# Patient Record
Sex: FEMALE | Race: WHITE | NOT HISPANIC OR LATINO
[De-identification: names, ages, dates, MRNs, and addresses within clinical notes are randomized per-mention and may not be internally consistent; named-entity substitution may affect disease eponyms.]

---

## 2019-10-14 ENCOUNTER — TRANSCRIPTION ENCOUNTER (OUTPATIENT)
Age: 59
End: 2019-10-14

## 2020-04-26 ENCOUNTER — MESSAGE (OUTPATIENT)
Age: 60
End: 2020-04-26

## 2020-05-07 ENCOUNTER — APPOINTMENT (OUTPATIENT)
Dept: DISASTER EMERGENCY | Facility: CLINIC | Age: 60
End: 2020-05-07

## 2020-05-07 LAB
SARS-COV-2 IGG SERPL IA-ACNC: <0.1 INDEX
SARS-COV-2 IGG SERPL QL IA: NEGATIVE

## 2021-06-27 ENCOUNTER — TRANSCRIPTION ENCOUNTER (OUTPATIENT)
Age: 61
End: 2021-06-27

## 2022-06-23 ENCOUNTER — NON-APPOINTMENT (OUTPATIENT)
Age: 62
End: 2022-06-23

## 2022-06-28 ENCOUNTER — INPATIENT (INPATIENT)
Facility: HOSPITAL | Age: 62
LOS: 2 days | Discharge: HOME | DRG: 178 | End: 2022-07-01
Attending: STUDENT IN AN ORGANIZED HEALTH CARE EDUCATION/TRAINING PROGRAM | Admitting: STUDENT IN AN ORGANIZED HEALTH CARE EDUCATION/TRAINING PROGRAM
Payer: COMMERCIAL

## 2022-06-28 VITALS
OXYGEN SATURATION: 92 % | HEART RATE: 96 BPM | RESPIRATION RATE: 18 BRPM | SYSTOLIC BLOOD PRESSURE: 100 MMHG | TEMPERATURE: 99 F | DIASTOLIC BLOOD PRESSURE: 83 MMHG

## 2022-06-28 DIAGNOSIS — J18.9 PNEUMONIA, UNSPECIFIED ORGANISM: ICD-10-CM

## 2022-06-28 LAB
ALBUMIN SERPL ELPH-MCNC: 2.5 G/DL — LOW (ref 3.3–5)
ALP SERPL-CCNC: 196 U/L — HIGH (ref 40–120)
ALT FLD-CCNC: 89 U/L — HIGH (ref 12–78)
ANION GAP SERPL CALC-SCNC: 7 MMOL/L — SIGNIFICANT CHANGE UP (ref 5–17)
APPEARANCE UR: ABNORMAL
AST SERPL-CCNC: 192 U/L — HIGH (ref 15–37)
B BURGDOR C6 AB SER-ACNC: NEGATIVE — SIGNIFICANT CHANGE UP
B BURGDOR IGG+IGM SER-ACNC: 0.05 INDEX — SIGNIFICANT CHANGE UP (ref 0.01–0.89)
BASOPHILS # BLD AUTO: 0 K/UL — SIGNIFICANT CHANGE UP (ref 0–0.2)
BASOPHILS NFR BLD AUTO: 0 % — SIGNIFICANT CHANGE UP (ref 0–2)
BILIRUB SERPL-MCNC: 1.6 MG/DL — HIGH (ref 0.2–1.2)
BILIRUB UR-MCNC: ABNORMAL
BUN SERPL-MCNC: 24 MG/DL — HIGH (ref 7–23)
CALCIUM SERPL-MCNC: 9 MG/DL — SIGNIFICANT CHANGE UP (ref 8.5–10.1)
CHLORIDE SERPL-SCNC: 93 MMOL/L — LOW (ref 96–108)
CO2 SERPL-SCNC: 32 MMOL/L — HIGH (ref 22–31)
COLOR SPEC: YELLOW — SIGNIFICANT CHANGE UP
CREAT SERPL-MCNC: 1.2 MG/DL — SIGNIFICANT CHANGE UP (ref 0.5–1.3)
DIFF PNL FLD: ABNORMAL
EGFR: 52 ML/MIN/1.73M2 — LOW
EOSINOPHIL # BLD AUTO: 0 K/UL — SIGNIFICANT CHANGE UP (ref 0–0.5)
EOSINOPHIL NFR BLD AUTO: 0 % — SIGNIFICANT CHANGE UP (ref 0–6)
FLUAV AG NPH QL: SIGNIFICANT CHANGE UP
FLUBV AG NPH QL: SIGNIFICANT CHANGE UP
GLUCOSE SERPL-MCNC: 115 MG/DL — HIGH (ref 70–99)
GLUCOSE UR QL: NEGATIVE — SIGNIFICANT CHANGE UP
HCT VFR BLD CALC: 41.7 % — SIGNIFICANT CHANGE UP (ref 34.5–45)
HGB BLD-MCNC: 14.4 G/DL — SIGNIFICANT CHANGE UP (ref 11.5–15.5)
KETONES UR-MCNC: NEGATIVE — SIGNIFICANT CHANGE UP
LEUKOCYTE ESTERASE UR-ACNC: ABNORMAL
LIDOCAIN IGE QN: 1072 U/L — HIGH (ref 73–393)
LYMPHOCYTES # BLD AUTO: 0.72 K/UL — LOW (ref 1–3.3)
LYMPHOCYTES # BLD AUTO: 8.5 % — LOW (ref 13–44)
MCHC RBC-ENTMCNC: 31.8 PG — SIGNIFICANT CHANGE UP (ref 27–34)
MCHC RBC-ENTMCNC: 34.5 GM/DL — SIGNIFICANT CHANGE UP (ref 32–36)
MCV RBC AUTO: 92.1 FL — SIGNIFICANT CHANGE UP (ref 80–100)
MONOCYTES # BLD AUTO: 0.68 K/UL — SIGNIFICANT CHANGE UP (ref 0–0.9)
MONOCYTES NFR BLD AUTO: 8 % — SIGNIFICANT CHANGE UP (ref 2–14)
NEUTROPHILS # BLD AUTO: 6.76 K/UL — SIGNIFICANT CHANGE UP (ref 1.8–7.4)
NEUTROPHILS NFR BLD AUTO: 75 % — SIGNIFICANT CHANGE UP (ref 43–77)
NITRITE UR-MCNC: NEGATIVE — SIGNIFICANT CHANGE UP
NRBC # BLD: SIGNIFICANT CHANGE UP /100 WBCS (ref 0–0)
PH UR: 6 — SIGNIFICANT CHANGE UP (ref 5–8)
PLATELET # BLD AUTO: 234 K/UL — SIGNIFICANT CHANGE UP (ref 150–400)
POTASSIUM SERPL-MCNC: 3.5 MMOL/L — SIGNIFICANT CHANGE UP (ref 3.5–5.3)
POTASSIUM SERPL-SCNC: 3.5 MMOL/L — SIGNIFICANT CHANGE UP (ref 3.5–5.3)
PROT SERPL-MCNC: 7.5 GM/DL — SIGNIFICANT CHANGE UP (ref 6–8.3)
PROT UR-MCNC: 30 MG/DL
RBC # BLD: 4.53 M/UL — SIGNIFICANT CHANGE UP (ref 3.8–5.2)
RBC # FLD: 13.8 % — SIGNIFICANT CHANGE UP (ref 10.3–14.5)
RSV RNA NPH QL NAA+NON-PROBE: SIGNIFICANT CHANGE UP
SARS-COV-2 RNA SPEC QL NAA+PROBE: SIGNIFICANT CHANGE UP
SODIUM SERPL-SCNC: 132 MMOL/L — LOW (ref 135–145)
SP GR SPEC: 1.01 — SIGNIFICANT CHANGE UP (ref 1.01–1.02)
TROPONIN I, HIGH SENSITIVITY RESULT: 23.91 NG/L — SIGNIFICANT CHANGE UP
UROBILINOGEN FLD QL: 8
WBC # BLD: 8.5 K/UL — SIGNIFICANT CHANGE UP (ref 3.8–10.5)
WBC # FLD AUTO: 8.5 K/UL — SIGNIFICANT CHANGE UP (ref 3.8–10.5)

## 2022-06-28 PROCEDURE — 74177 CT ABD & PELVIS W/CONTRAST: CPT | Mod: 26,MA

## 2022-06-28 PROCEDURE — 99223 1ST HOSP IP/OBS HIGH 75: CPT

## 2022-06-28 PROCEDURE — 86803 HEPATITIS C AB TEST: CPT

## 2022-06-28 PROCEDURE — 71045 X-RAY EXAM CHEST 1 VIEW: CPT | Mod: 26

## 2022-06-28 PROCEDURE — 99285 EMERGENCY DEPT VISIT HI MDM: CPT

## 2022-06-28 PROCEDURE — 71250 CT THORAX DX C-: CPT | Mod: 26,MA

## 2022-06-28 PROCEDURE — 94760 N-INVAS EAR/PLS OXIMETRY 1: CPT

## 2022-06-28 PROCEDURE — 83735 ASSAY OF MAGNESIUM: CPT

## 2022-06-28 PROCEDURE — 93010 ELECTROCARDIOGRAM REPORT: CPT

## 2022-06-28 PROCEDURE — 85027 COMPLETE CBC AUTOMATED: CPT

## 2022-06-28 PROCEDURE — 83690 ASSAY OF LIPASE: CPT

## 2022-06-28 PROCEDURE — 87449 NOS EACH ORGANISM AG IA: CPT

## 2022-06-28 PROCEDURE — 76705 ECHO EXAM OF ABDOMEN: CPT | Mod: 26

## 2022-06-28 PROCEDURE — 80053 COMPREHEN METABOLIC PANEL: CPT

## 2022-06-28 PROCEDURE — 36415 COLL VENOUS BLD VENIPUNCTURE: CPT

## 2022-06-28 RX ORDER — SODIUM CHLORIDE 9 MG/ML
1000 INJECTION INTRAMUSCULAR; INTRAVENOUS; SUBCUTANEOUS ONCE
Refills: 0 | Status: COMPLETED | OUTPATIENT
Start: 2022-06-28 | End: 2022-06-28

## 2022-06-28 RX ORDER — ACETAMINOPHEN 500 MG
650 TABLET ORAL EVERY 6 HOURS
Refills: 0 | Status: DISCONTINUED | OUTPATIENT
Start: 2022-06-28 | End: 2022-07-01

## 2022-06-28 RX ORDER — AZITHROMYCIN 500 MG/1
500 TABLET, FILM COATED ORAL ONCE
Refills: 0 | Status: COMPLETED | OUTPATIENT
Start: 2022-06-28 | End: 2022-06-28

## 2022-06-28 RX ORDER — LOSARTAN/HYDROCHLOROTHIAZIDE 100MG-25MG
1 TABLET ORAL
Qty: 0 | Refills: 0 | DISCHARGE

## 2022-06-28 RX ORDER — LANOLIN ALCOHOL/MO/W.PET/CERES
3 CREAM (GRAM) TOPICAL AT BEDTIME
Refills: 0 | Status: DISCONTINUED | OUTPATIENT
Start: 2022-06-28 | End: 2022-07-01

## 2022-06-28 RX ORDER — SODIUM CHLORIDE 9 MG/ML
1000 INJECTION INTRAMUSCULAR; INTRAVENOUS; SUBCUTANEOUS
Refills: 0 | Status: DISCONTINUED | OUTPATIENT
Start: 2022-06-28 | End: 2022-06-30

## 2022-06-28 RX ORDER — ONDANSETRON 8 MG/1
4 TABLET, FILM COATED ORAL EVERY 8 HOURS
Refills: 0 | Status: DISCONTINUED | OUTPATIENT
Start: 2022-06-28 | End: 2022-07-01

## 2022-06-28 RX ORDER — LABETALOL HCL 100 MG
300 TABLET ORAL EVERY 12 HOURS
Refills: 0 | Status: DISCONTINUED | OUTPATIENT
Start: 2022-06-28 | End: 2022-07-01

## 2022-06-28 RX ORDER — ENOXAPARIN SODIUM 100 MG/ML
40 INJECTION SUBCUTANEOUS EVERY 24 HOURS
Refills: 0 | Status: DISCONTINUED | OUTPATIENT
Start: 2022-06-28 | End: 2022-07-01

## 2022-06-28 RX ORDER — CEFTRIAXONE 500 MG/1
1000 INJECTION, POWDER, FOR SOLUTION INTRAMUSCULAR; INTRAVENOUS ONCE
Refills: 0 | Status: COMPLETED | OUTPATIENT
Start: 2022-06-28 | End: 2022-06-28

## 2022-06-28 RX ORDER — ATORVASTATIN CALCIUM 80 MG/1
40 TABLET, FILM COATED ORAL DAILY
Refills: 0 | Status: DISCONTINUED | OUTPATIENT
Start: 2022-06-28 | End: 2022-07-01

## 2022-06-28 RX ORDER — AZITHROMYCIN 500 MG/1
500 TABLET, FILM COATED ORAL EVERY 24 HOURS
Refills: 0 | Status: DISCONTINUED | OUTPATIENT
Start: 2022-06-28 | End: 2022-07-01

## 2022-06-28 RX ORDER — LABETALOL HCL 100 MG
600 TABLET ORAL
Refills: 0 | Status: DISCONTINUED | OUTPATIENT
Start: 2022-06-28 | End: 2022-06-28

## 2022-06-28 RX ORDER — ATORVASTATIN CALCIUM 80 MG/1
1 TABLET, FILM COATED ORAL
Qty: 0 | Refills: 0 | DISCHARGE

## 2022-06-28 RX ORDER — LABETALOL HCL 100 MG
2 TABLET ORAL
Qty: 0 | Refills: 0 | DISCHARGE

## 2022-06-28 RX ORDER — OXYMETAZOLINE HYDROCHLORIDE 0.5 MG/ML
1 SPRAY NASAL ONCE
Refills: 0 | Status: COMPLETED | OUTPATIENT
Start: 2022-06-28 | End: 2022-06-28

## 2022-06-28 RX ORDER — ASPIRIN/CALCIUM CARB/MAGNESIUM 324 MG
81 TABLET ORAL DAILY
Refills: 0 | Status: DISCONTINUED | OUTPATIENT
Start: 2022-06-28 | End: 2022-07-01

## 2022-06-28 RX ORDER — ASPIRIN/CALCIUM CARB/MAGNESIUM 324 MG
1 TABLET ORAL
Qty: 0 | Refills: 0 | DISCHARGE

## 2022-06-28 RX ORDER — CEFTRIAXONE 500 MG/1
2000 INJECTION, POWDER, FOR SOLUTION INTRAMUSCULAR; INTRAVENOUS EVERY 24 HOURS
Refills: 0 | Status: DISCONTINUED | OUTPATIENT
Start: 2022-06-28 | End: 2022-06-29

## 2022-06-28 RX ADMIN — Medication 300 MILLIGRAM(S): at 21:16

## 2022-06-28 RX ADMIN — OXYMETAZOLINE HYDROCHLORIDE 1 SPRAY(S): 0.5 SPRAY NASAL at 12:20

## 2022-06-28 RX ADMIN — AZITHROMYCIN 500 MILLIGRAM(S): 500 TABLET, FILM COATED ORAL at 12:50

## 2022-06-28 RX ADMIN — CEFTRIAXONE 100 MILLIGRAM(S): 500 INJECTION, POWDER, FOR SOLUTION INTRAMUSCULAR; INTRAVENOUS at 12:50

## 2022-06-28 RX ADMIN — SODIUM CHLORIDE 83 MILLILITER(S): 9 INJECTION INTRAMUSCULAR; INTRAVENOUS; SUBCUTANEOUS at 17:12

## 2022-06-28 RX ADMIN — ATORVASTATIN CALCIUM 40 MILLIGRAM(S): 80 TABLET, FILM COATED ORAL at 21:16

## 2022-06-28 RX ADMIN — SODIUM CHLORIDE 1000 MILLILITER(S): 9 INJECTION INTRAMUSCULAR; INTRAVENOUS; SUBCUTANEOUS at 11:18

## 2022-06-28 NOTE — PATIENT PROFILE ADULT - INFLUENZA IMMUNIZATION DATE (APPROXIMATE)
----- Message from Manisha Perez MD sent at 3/14/2020  6:24 PM CDT -----  Test results are normal.   01-Oct-2021

## 2022-06-28 NOTE — PHARMACOTHERAPY INTERVENTION NOTE - COMMENTS
Medication history complete, reviewed medication with patient and [facility]

## 2022-06-28 NOTE — ED STATDOCS - PROGRESS NOTE DETAILS
63 yo female with a PMH of htn, hld, presents with weakness since last monday. Pt noticed that it started monday and worsened tuesday into wednesday. Thursday she mentioned that couldn't get out of bed and had a fever of 103F 2 days ago. Pt was seen at urgent care and told her its prob a reaction to her pfizer vaccines that she received a long time ago and came back negative for covid. +nausea. Denies rashes, hiking, abd pain, cp, sob. Will obtain labs, ivf, cxr, and reeval. -Job Rodriguez PA-C Discussed results and plan with pt for admission. Because of elevated LFTs, bili and CT finding of the gallbladder discussed case with surgery resident who will see pt but doesn't think it is related to the gallbladder. Pt to be admitted to medicine. -Job Rodriguez PA-C

## 2022-06-28 NOTE — CONSULT NOTE ADULT - ASSESSMENT
62F admitted for weakness and fever presents with findings of cholelithiasis and elevated LFTs. Likely underlying gallstone pancreatitis and possible recent hx of choledocholithiasis    recommendations:  CLD  GI evaluation  medical management of pneumonia  trend LFTs and lipase  serial abdominal exams  possible laparoscopic cholecystectomy during this admission    Plan discussed with Dr. Sr 62F admitted for weakness and fever presents with findings of cholelithiasis and elevated LFTs. Likely underlying gallstone pancreatitis and possible recent hx of choledocholithiasis    recommendations:  CLD  GI evaluation  IV abx  medical management of pneumonia  trend LFTs and lipase  serial abdominal exams  possible laparoscopic cholecystectomy during this admission    Plan discussed with Dr. Sr

## 2022-06-28 NOTE — ED STATDOCS - OBJECTIVE STATEMENT
62 y//o female with a PMHx of HTN and HLD presents to the ED with cough, weakness and decreased po intake x8 days Pt with Fever x2 days ago Tmax 103. Pt went to Urgent Care and tested negative for COVID at that time. No abdominal pain, no chest pain, no rashes. No  symptoms. PMD: Dr. Denys Mooney. 63 y/o female with a PMHx of HTN and HLD presents to the ED with cough, weakness and decreased po intake x8 days Pt with Fever x2 days ago Tmax 103. Pt went to Urgent Care and tested negative for COVID at that time. No abdominal pain, no chest pain, no rashes. No  symptoms. PMD: Dr. Denys Mooney.

## 2022-06-28 NOTE — PATIENT PROFILE ADULT - FALL HARM RISK - UNIVERSAL INTERVENTIONS
Bed in lowest position, wheels locked, appropriate side rails in place/Call bell, personal items and telephone in reach/Instruct patient to call for assistance before getting out of bed or chair/Non-slip footwear when patient is out of bed/[place] to call system/Physically safe environment - no spills, clutter or unnecessary equipment/Purposeful Proactive Rounding/Room/bathroom lighting operational, light cord in reach

## 2022-06-28 NOTE — H&P ADULT - HISTORY OF PRESENT ILLNESS
63 y/o female with a PMHx of HTN and HLD presents to the ED with cough, weakness and decreased po intake x8 days Pt with Fever x2 days ago Tmax 103. Pt went to Urgent Care and tested negative for COVID at that time. No abdominal pain, no chest pain, no rashes. No  symptoms. PMD: Dr. Denys Mooney.  She states that she mostly had symptoms of exhaustion , chills and intermittent fevers for the past week.   She has pets at home: a Guinea pig    PAST MEDICAL HISTORY:  HLD (hyperlipidemia)   HTN (hypertension).    Surgical History:  C section  bilateral knee meniscal repair    Social History:  no smoking, no Etoh    Family History:   father had massive MI age 63        REVIEW OF SYSTEMS:    CONSTITUTIONAL: No weakness, +fevers, +chills  ENT: No ear ache, No sorethroat  NECK: No pain, No stiffness  RESPIRATORY: No cough, No wheezing, No hemoptysis; No dyspnea  CARDIOVASCULAR: No chest pain, No palpitations  GASTROINTESTINAL: No abd pain, No nausea, No vomiting, No hematemesis, No diarrhea or constipation. No melena, No hematochezia.  GENITOURINARY: No dysuria, No  hematuria  NEUROLOGICAL: No diplopia, No paresthesia, No motor dysfunction  MUSCULOSKELETAL: No arthralgia, No myalgia  SKIN: No rashes, or lesions   PSYCH: no anxiety, no suicidal ideation    All other review of systems is negative unless indicated above    Vital Signs Last 24 Hrs  T(C): 37.3 (28 Jun 2022 10:04), Max: 37.3 (28 Jun 2022 10:04)  T(F): 99.1 (28 Jun 2022 10:04), Max: 99.1 (28 Jun 2022 10:04)  HR: 96 (28 Jun 2022 10:04) (96 - 96)  BP: 100/83 (28 Jun 2022 10:04) (100/83 - 100/83)  BP(mean): 89 (28 Jun 2022 10:04) (89 - 89)  RR: 18 (28 Jun 2022 10:04) (18 - 18)  SpO2: 92% (28 Jun 2022 10:04) (92% - 92%)    PHYSICAL EXAM:    GENERAL: NAD  HEENT:  NC/AT, EOMI, PERRLA, No scleral icterus, Moist mucous membranes  NECK: Supple, No JVD  CNS:  Alert & Oriented X3, Motor Strength 5/5 B/L upper and lower extremities; DTRs 2+ intact   LUNG: Normal Breath sounds, Clear to auscultation bilaterally, No rales, No rhonchi, No wheezing  HEART: RRR; No murmurs, No rubs  ABDOMEN: +BS, ST/ND/NT  GENITOURINARY: Voiding, Bladder not distended  EXTREMITIES:  2+ Peripheral Pulses, No clubbing, No cyanosis, No tibial edema  MUSCULOSKELTAL: Joints normal ROM, No TTP, No effusion  SKIN: no rashes  RECTAL: deferred, not indicated  BREAST: deferred                          14.4   8.50  )-----------( 234      ( 28 Jun 2022 10:43 )             41.7     06-28    132<L>  |  93<L>  |  24<H>  ----------------------------<  115<H>  3.5   |  32<H>  |  1.20    Ca    9.0      28 Jun 2022 10:43    TPro  7.5  /  Alb  2.5<L>  /  TBili  1.6<H>  /  DBili  x   /  AST  192<H>  /  ALT  89<H>  /  AlkPhos  196<H>  06-28      a/p:    1. Bilateral Pna:  In view of abnormal LFTs will consider Legionella Pna in the differential   Ceftriaxone / Zithromax IV  f/u Blood cx  Legionella urine Ag    2. Transaminitis:  unlikely due to GB pathology; surgical eval noted  +hepatic steatosis on CT and US  will check hepatitis panel  will consider transaminitis due to sepsis (possible Legionella Pna)    3. Hyponatremia due to Pna and dehydration:  NS at 75cc/hr x 24hrs        61 y/o female with a PMHx of HTN and HLD presents to the ED with cough, weakness and decreased po intake x8 days Pt with Fever x2 days ago Tmax 103. Pt went to Urgent Care and tested negative for COVID at that time. No abdominal pain, no chest pain, no rashes. No  symptoms. PMD: Dr. Denys Mooney.  She states that she mostly had symptoms of exhaustion , chills and intermittent fevers for the past week.   She has pets at home: a Guinea pig    PAST MEDICAL HISTORY:  HLD (hyperlipidemia)   HTN (hypertension).    Surgical History:  C section  bilateral knee meniscal repair    Social History:  no smoking, no Etoh    Family History:   father had massive MI age 63        REVIEW OF SYSTEMS:    CONSTITUTIONAL: No weakness, +fevers, +chills  ENT: No ear ache, No sorethroat  NECK: No pain, No stiffness  RESPIRATORY: No cough, No wheezing, No hemoptysis; No dyspnea  CARDIOVASCULAR: No chest pain, No palpitations  GASTROINTESTINAL: No abd pain, No nausea, No vomiting, No hematemesis, No diarrhea or constipation. No melena, No hematochezia.  GENITOURINARY: No dysuria, No  hematuria  NEUROLOGICAL: No diplopia, No paresthesia, No motor dysfunction  MUSCULOSKELETAL: No arthralgia, No myalgia  SKIN: No rashes, or lesions   PSYCH: no anxiety, no suicidal ideation    All other review of systems is negative unless indicated above    Vital Signs Last 24 Hrs  T(C): 37.3 (28 Jun 2022 10:04), Max: 37.3 (28 Jun 2022 10:04)  T(F): 99.1 (28 Jun 2022 10:04), Max: 99.1 (28 Jun 2022 10:04)  HR: 96 (28 Jun 2022 10:04) (96 - 96)  BP: 100/83 (28 Jun 2022 10:04) (100/83 - 100/83)  BP(mean): 89 (28 Jun 2022 10:04) (89 - 89)  RR: 18 (28 Jun 2022 10:04) (18 - 18)  SpO2: 92% (28 Jun 2022 10:04) (92% - 92%)    PHYSICAL EXAM:    GENERAL: NAD  HEENT:  NC/AT, EOMI, PERRLA, No scleral icterus, Moist mucous membranes  NECK: Supple, No JVD  CNS:  Alert & Oriented X3, Motor Strength 5/5 B/L upper and lower extremities; DTRs 2+ intact   LUNG: Normal Breath sounds, +bilateral  rales, No rhonchi, No wheezing  HEART: RRR; No murmurs, No rubs  ABDOMEN: +BS, ST/ND/NT  GENITOURINARY: Voiding, Bladder not distended  EXTREMITIES:  2+ Peripheral Pulses, No clubbing, No cyanosis, No tibial edema  MUSCULOSKELTAL: Joints normal ROM, No TTP, No effusion  SKIN: no rashes  RECTAL: deferred, not indicated  BREAST: deferred                          14.4   8.50  )-----------( 234      ( 28 Jun 2022 10:43 )             41.7     06-28    132<L>  |  93<L>  |  24<H>  ----------------------------<  115<H>  3.5   |  32<H>  |  1.20    Ca    9.0      28 Jun 2022 10:43    TPro  7.5  /  Alb  2.5<L>  /  TBili  1.6<H>  /  DBili  x   /  AST  192<H>  /  ALT  89<H>  /  AlkPhos  196<H>  06-28      a/p:    1. Bilateral Pna:  In view of abnormal LFTs will consider Legionella Pna in the differential   Ceftriaxone / Zithromax IV  f/u Blood cx  Legionella urine Ag    2. Transaminitis:  unlikely due to GB pathology; surgical eval noted  +hepatic steatosis on CT and US  will check hepatitis panel  will consider transaminitis due to sepsis (possible Legionella Pna)    3. Hyponatremia due to Pna and dehydration:  NS at 75cc/hr x 24hrs        61 y/o female with a PMHx of HTN and HLD presents to the ED with cough, weakness and decreased po intake x8 days Pt with Fever x2 days ago Tmax 103. Pt went to Urgent Care and tested negative for COVID at that time. No abdominal pain, no chest pain, no rashes. No  symptoms. PMD: Dr. Denys Mooney.  She states that she mostly had symptoms of exhaustion , chills and intermittent fevers for the past week.   She has pets at home: a Guinea pig    PAST MEDICAL HISTORY:  HLD (hyperlipidemia)   HTN (hypertension).    Surgical History:  C section  bilateral knee meniscal repair    Social History:  no smoking, no Etoh    Family History:   father had massive MI age 63        REVIEW OF SYSTEMS:    CONSTITUTIONAL: No weakness, +fevers, +chills  ENT: No ear ache, No sorethroat  NECK: No pain, No stiffness  RESPIRATORY: No cough, No wheezing, No hemoptysis; No dyspnea  CARDIOVASCULAR: No chest pain, No palpitations  GASTROINTESTINAL: No abd pain, No nausea, No vomiting, No hematemesis, No diarrhea or constipation. No melena, No hematochezia.  GENITOURINARY: No dysuria, No  hematuria  NEUROLOGICAL: No diplopia, No paresthesia, No motor dysfunction  MUSCULOSKELETAL: No arthralgia, No myalgia  SKIN: No rashes, or lesions   PSYCH: no anxiety, no suicidal ideation    All other review of systems is negative unless indicated above    Vital Signs Last 24 Hrs  T(C): 37.3 (28 Jun 2022 10:04), Max: 37.3 (28 Jun 2022 10:04)  T(F): 99.1 (28 Jun 2022 10:04), Max: 99.1 (28 Jun 2022 10:04)  HR: 96 (28 Jun 2022 10:04) (96 - 96)  BP: 100/83 (28 Jun 2022 10:04) (100/83 - 100/83)  BP(mean): 89 (28 Jun 2022 10:04) (89 - 89)  RR: 18 (28 Jun 2022 10:04) (18 - 18)  SpO2: 92% (28 Jun 2022 10:04) (92% - 92%)    PHYSICAL EXAM:    GENERAL: NAD  HEENT:  NC/AT, EOMI, PERRLA, No scleral icterus, Moist mucous membranes  NECK: Supple, No JVD  CNS:  Alert & Oriented X3, Motor Strength 5/5 B/L upper and lower extremities; DTRs 2+ intact   LUNG: Normal Breath sounds, +bilateral  rales, No rhonchi, No wheezing  HEART: RRR; No murmurs, No rubs  ABDOMEN: +BS, ST/ND/NT  GENITOURINARY: Voiding, Bladder not distended  EXTREMITIES:  2+ Peripheral Pulses, No clubbing, No cyanosis, No tibial edema  MUSCULOSKELTAL: Joints normal ROM, No TTP, No effusion  SKIN: no rashes  RECTAL: deferred, not indicated  BREAST: deferred                          14.4   8.50  )-----------( 234      ( 28 Jun 2022 10:43 )             41.7     06-28    132<L>  |  93<L>  |  24<H>  ----------------------------<  115<H>  3.5   |  32<H>  |  1.20    Ca    9.0      28 Jun 2022 10:43    TPro  7.5  /  Alb  2.5<L>  /  TBili  1.6<H>  /  DBili  x   /  AST  192<H>  /  ALT  89<H>  /  AlkPhos  196<H>  06-28      a/p:    1. Bilateral Pna:  In view of abnormal LFTs will consider Legionella Pna in the differential   Ceftriaxone / Zithromax IV  f/u Blood cx  Legionella urine Ag    2. Transaminitis:  unlikely due to GB pathology; surgical eval noted  +hepatic steatosis on CT and US  will check hepatitis panel  will consider transaminitis due to sepsis (possible Legionella Pna)    3. Hyponatremia due to Pna and dehydration:  NS at 75cc/hr x 24hrs   stop HCTZ    4. Hypotension:  likely in the setting of Pna and dehydration   d/c Losartan/HCTZ  lower Labetalol to 300mg Bid  reassess in am

## 2022-06-28 NOTE — ED STATDOCS - CLINICAL SUMMARY MEDICAL DECISION MAKING FREE TEXT BOX
Pt with 8 days of fatigue, cough, intermittent fever, had negative COVID swab 2 days ago, still not feeling well, decreased po intake. Will check labs, urine, CXR, viral swab, fluid hydration and reassess.

## 2022-06-28 NOTE — ED STATDOCS - ATTENDING APP SHARED VISIT CONTRIBUTION OF CARE
I personally saw the patient with the GINO, and completed the key components of the history and physical exam. I then discussed the management plan with the GINO.

## 2022-06-28 NOTE — CONSULT NOTE ADULT - SUBJECTIVE AND OBJECTIVE BOX
61 y/o female with a PMHx of HTN and HLD presents to the ED with cough, weakness and decreased po intake x8 days. Pt with Fever x2 days ago Tmax 103. Pt went to Urgent Care and tested negative for COVID at that time. No abdominal pain, no chest pain, no rashes. No  symptoms. Admits to vomiting once and diarrhea this morning. Surgery consulted to evaluate patient for elevated LFTs and cholelithiasis on imaging.    Pt seen and examined at bedside, no acute events. Pt had no complaints. Tolerating liquid diet. Continues to have bowel function. Denied fever, chills, nausea, vomiting or SOB overnight.     Vital Signs Last 24 Hrs  T(C): 37.3 (28 Jun 2022 10:04), Max: 37.3 (28 Jun 2022 10:04)  T(F): 99.1 (28 Jun 2022 10:04), Max: 99.1 (28 Jun 2022 10:04)  HR: 96 (28 Jun 2022 10:04) (96 - 96)  BP: 100/83 (28 Jun 2022 10:04) (100/83 - 100/83)  BP(mean): 89 (28 Jun 2022 10:04) (89 - 89)  RR: 18 (28 Jun 2022 10:04) (18 - 18)  SpO2: 92% (28 Jun 2022 10:04) (92% - 92%)    Labs:                              14.4   8.50  )-----------( 234      ( 28 Jun 2022 10:43 )             41.7     CBC Full  -  ( 28 Jun 2022 10:43 )  WBC Count : 8.50 K/uL  RBC Count : 4.53 M/uL  Hemoglobin : 14.4 g/dL  Hematocrit : 41.7 %  Platelet Count - Automated : 234 K/uL  Mean Cell Volume : 92.1 fl  Mean Cell Hemoglobin : 31.8 pg  Mean Cell Hemoglobin Concentration : 34.5 gm/dL  Auto Neutrophil # : 6.76 K/uL  Auto Lymphocyte # : 0.72 K/uL  Auto Monocyte # : 0.68 K/uL  Auto Eosinophil # : 0.00 K/uL  Auto Basophil # : 0.00 K/uL  Auto Neutrophil % : 75.0 %  Auto Lymphocyte % : 8.5 %  Auto Monocyte % : 8.0 %  Auto Eosinophil % : x  Auto Basophil % : 0.0 %    06-28    132<L>  |  93<L>  |  24<H>  ----------------------------<  115<H>  3.5   |  32<H>  |  1.20    Ca    9.0      28 Jun 2022 10:43    TPro  7.5  /  Alb  2.5<L>  /  TBili  1.6<H>  /  DBili  x   /  AST  192<H>  /  ALT  89<H>  /  AlkPhos  196<H>  06-28    LIVER FUNCTIONS - ( 28 Jun 2022 10:43 )  Alb: 2.5 g/dL / Pro: 7.5 gm/dL / ALK PHOS: 196 U/L / ALT: 89 U/L / AST: 192 U/L / GGT: x                   Physical Exam:  Pt is AAOx3  General: Well developed, in no acute distress.   Chest: Lungs clear, no rales, no rhonchi, no wheezes.   Heart: RR, no murmurs, no rubs, no gallops.   Abdomen: Soft, no tenderness, nondistended, no masses, BS normal.    Back: Normal curvature, no tenderness.   Neuro: Physiological, no localizing findings.   Skin: Normal, no rashes, no lesions noted.   Extremities: Warm, well perfused, no edema, Pulses intact    ACC: [id number] EXAM:  US ABDOMEN RT UPR QUADRANT                          PROCEDURE DATE:  06/28/2022          INTERPRETATION:  CLINICAL INFORMATION: Elevated LFTs.    COMPARISON: CT dated 06/28/2022.    TECHNIQUE: Sonography of the right upper quadrant.    FINDINGS:  Liver: Increased echogenicity. The liver measures approximately 19 cm.   There is a 1.4 cm left hepatic cyst. Normal portal venous flow.  Bile ducts: Normal caliber. Common bile duct measures 4 mm.  Gallbladder: A combination of cholelithiasis and sludge. Normal-appearing   gallbladder wall. Negative Zuniga's sign.  Pancreas: Visualized portions are within normal limits.  Right kidney: 9.7 cm. No hydronephrosis.  Ascites: None.  IVC: Visualized portions are within normal limits.    IMPRESSION:  Hepatic steatosis.  Cholelithiasis.      ACC: [id number] EXAM:  CT ABDOMEN AND PELVIS IC                          PROCEDURE DATE:  06/28/2022          INTERPRETATION:  CLINICAL INFORMATION: 62 years  Female with diffuse   weakness, n/v.    COMPARISON: None.    CONTRAST/COMPLICATIONS:  IV Contrast: Omnipaque 350  90 cc administered   10 cc discarded  Oral Contrast: NONE  Complications: None reported at time of study completion    PROCEDURE:  CT of the Abdomen and Pelvis was performed.  Sagittal and coronal reformats were performed.    FINDINGS:  LOWER CHEST: Right lower lobe pneumonia. Right middle lobe discoid   atelectasis. Trace pericardial effusion.    LIVER: Steatosis. 1.6 cm left lobe cyst.  BILE DUCTS: Normal caliber.  GALLBLADDER: Cholelithiasis. Focal fundal wall thickening,possibly   adenomyomatosis.  SPLEEN: Within normal limits. Adjacent splenules.  PANCREAS: Within normal limits.  ADRENALS: Mildly thickened nodular adrenal glands.  KIDNEYS/URETERS: Within normal limits.    BLADDER: Within normal limits.  REPRODUCTIVE ORGANS: 2.5 cm left posterior subserosal myoma. 1 cm left   fundal calcified myoma. Grossly unremarkable ovaries.    BOWEL: No bowel obstruction. Appendix is normal.  PERITONEUM: No ascites.  VESSELS: Mild atherosclerosis.  RETROPERITONEUM/LYMPH NODES: No lymphadenopathy.  ABDOMINAL WALL: Minimal fat-containing umbilical hernia.  BONES: Within normal limits.    IMPRESSION:  Cholelithiasis. Gallbladder fundal wall thickening possibly   adenomyomatosis. Correlate with ultrasound.    Hepatic steatosis.    Right lower lobe pneumonia.    Trace pericardial effusion.

## 2022-06-29 LAB
ALBUMIN SERPL ELPH-MCNC: 2.2 G/DL — LOW (ref 3.3–5)
ALP SERPL-CCNC: 209 U/L — HIGH (ref 40–120)
ALT FLD-CCNC: 78 U/L — SIGNIFICANT CHANGE UP (ref 12–78)
ANION GAP SERPL CALC-SCNC: 6 MMOL/L — SIGNIFICANT CHANGE UP (ref 5–17)
AST SERPL-CCNC: 138 U/L — HIGH (ref 15–37)
BILIRUB SERPL-MCNC: 1 MG/DL — SIGNIFICANT CHANGE UP (ref 0.2–1.2)
BUN SERPL-MCNC: 18 MG/DL — SIGNIFICANT CHANGE UP (ref 7–23)
CALCIUM SERPL-MCNC: 8.3 MG/DL — LOW (ref 8.5–10.1)
CHLORIDE SERPL-SCNC: 102 MMOL/L — SIGNIFICANT CHANGE UP (ref 96–108)
CO2 SERPL-SCNC: 28 MMOL/L — SIGNIFICANT CHANGE UP (ref 22–31)
CREAT SERPL-MCNC: 0.88 MG/DL — SIGNIFICANT CHANGE UP (ref 0.5–1.3)
CRP SERPL-MCNC: 258 MG/L — HIGH
EGFR: 75 ML/MIN/1.73M2 — SIGNIFICANT CHANGE UP
GLUCOSE SERPL-MCNC: 142 MG/DL — HIGH (ref 70–99)
HCV AB S/CO SERPL IA: 0.09 S/CO — SIGNIFICANT CHANGE UP (ref 0–0.99)
HCV AB SERPL-IMP: SIGNIFICANT CHANGE UP
LEGIONELLA AG UR QL: POSITIVE
LIDOCAIN IGE QN: 1047 U/L — HIGH (ref 73–393)
POTASSIUM SERPL-MCNC: 2.9 MMOL/L — CRITICAL LOW (ref 3.5–5.3)
POTASSIUM SERPL-SCNC: 2.9 MMOL/L — CRITICAL LOW (ref 3.5–5.3)
PROT SERPL-MCNC: 6.2 GM/DL — SIGNIFICANT CHANGE UP (ref 6–8.3)
SODIUM SERPL-SCNC: 136 MMOL/L — SIGNIFICANT CHANGE UP (ref 135–145)

## 2022-06-29 PROCEDURE — 99233 SBSQ HOSP IP/OBS HIGH 50: CPT | Mod: GC

## 2022-06-29 RX ORDER — POTASSIUM CHLORIDE 20 MEQ
40 PACKET (EA) ORAL EVERY 4 HOURS
Refills: 0 | Status: COMPLETED | OUTPATIENT
Start: 2022-06-29 | End: 2022-06-30

## 2022-06-29 RX ADMIN — Medication 300 MILLIGRAM(S): at 11:26

## 2022-06-29 RX ADMIN — SODIUM CHLORIDE 83 MILLILITER(S): 9 INJECTION INTRAMUSCULAR; INTRAVENOUS; SUBCUTANEOUS at 05:37

## 2022-06-29 RX ADMIN — Medication 40 MILLIEQUIVALENT(S): at 13:19

## 2022-06-29 RX ADMIN — AZITHROMYCIN 255 MILLIGRAM(S): 500 TABLET, FILM COATED ORAL at 11:46

## 2022-06-29 RX ADMIN — Medication 300 MILLIGRAM(S): at 21:52

## 2022-06-29 RX ADMIN — Medication 40 MILLIEQUIVALENT(S): at 21:51

## 2022-06-29 RX ADMIN — SODIUM CHLORIDE 83 MILLILITER(S): 9 INJECTION INTRAMUSCULAR; INTRAVENOUS; SUBCUTANEOUS at 21:49

## 2022-06-29 RX ADMIN — Medication 81 MILLIGRAM(S): at 11:26

## 2022-06-29 RX ADMIN — ENOXAPARIN SODIUM 40 MILLIGRAM(S): 100 INJECTION SUBCUTANEOUS at 05:37

## 2022-06-29 RX ADMIN — ATORVASTATIN CALCIUM 40 MILLIGRAM(S): 80 TABLET, FILM COATED ORAL at 21:51

## 2022-06-29 NOTE — PROGRESS NOTE ADULT - ASSESSMENT
63 y/o female with a PMHx of HTN and HLD presents to the ED with cough, weakness and decreased po intake x8 days Pt with Fever x2 days ago Tmax 103. Pt went to Urgent Care and tested negative for COVID at that time. No abdominal pain, no chest pain, no rashes. No  symptoms. PMD: Dr. Denys Mooney.  She states that she mostly had symptoms of exhaustion , chills and intermittent fevers for the past week.   She has pets at home: a Guinea pig      # Bilateral legionella  Pneumonia  -In view of abnormal LFTs will consider Legionella Pna in the differential   -CT Zithromax IV  -Legionella urine Ag-POSITIVE    #Transaminitis  -? transaminitis due to sepsis (possible Legionella Pna)  -Abd U/S-cholelithiasis and sludge    #Hyponatremia due to Pna and dehydration:  -Resolved    #Hypotension:  likely in the setting of Pna and dehydration   d/c Losartan/HCTZ  lower Labetalol to 300mg Bid  reassess in am    -Case was discussed with      61 y/o female with a PMHx of HTN and HLD presents to the ED with cough, weakness and decreased po intake x8 days Pt with Fever x2 days ago Tmax 103. Pt went to Urgent Care and tested negative for COVID at that time. No abdominal pain, no chest pain, no rashes. No  symptoms. PMD: Dr. Denys Mooney.  She states that she mostly had symptoms of exhaustion , chills and intermittent fevers for the past week.   She has pets at home: a Guinea pig      # Bilateral legionella  Pneumonia  -In view of abnormal LFTs will consider Legionella Pna in the differential   -CT Zithromax IV  -Legionella urine Ag-POSITIVE    #Transaminitis  -? transaminitis due to sepsis (possible Legionella Pna)  -Abd U/S-cholelithiasis and sludge    #Hyponatremia due to Pna and dehydration:  -Resolved    #Hypotension:  likely in the setting of Pna and dehydration   d/c Losartan/HCTZ  lower Labetalol to 300mg Bid  reassess in am    #Mild pericardial effusion   -Stable   -Possible Cardiology consult     -Dvt prophylaxix   -SCD    #Code status   -Full code    -Case was discussed with

## 2022-06-29 NOTE — PROGRESS NOTE ADULT - ASSESSMENT
62F admitted for weakness and fever presents with findings of cholelithiasis and elevated LFTs. Likely underlying gallstone pancreatitis and possible recent hx of choledocholithiasis    recommendations:  CLD  GI evaluation  IV abx  medical management of pneumonia  MRCP  trend LFTs and lipase  serial abdominal exams      Plan discussed with Dr. Sr

## 2022-06-29 NOTE — PROVIDER CONTACT NOTE (OTHER) - SITUATION
patient went for ultrasound of kidney and bladder. As per ultrasound tech, frankel is not in baldder and in urethra. Ultrasound of bladder is reading 1000cc of urine

## 2022-06-30 LAB
ALBUMIN SERPL ELPH-MCNC: 2.1 G/DL — LOW (ref 3.3–5)
ALP SERPL-CCNC: 204 U/L — HIGH (ref 40–120)
ALT FLD-CCNC: 70 U/L — SIGNIFICANT CHANGE UP (ref 12–78)
ANION GAP SERPL CALC-SCNC: 5 MMOL/L — SIGNIFICANT CHANGE UP (ref 5–17)
AST SERPL-CCNC: 97 U/L — HIGH (ref 15–37)
B MICROTI IGG TITR SER: SIGNIFICANT CHANGE UP
B MICROTI IGM TITR SER: SIGNIFICANT CHANGE UP
BILIRUB SERPL-MCNC: 0.8 MG/DL — SIGNIFICANT CHANGE UP (ref 0.2–1.2)
BUN SERPL-MCNC: 14 MG/DL — SIGNIFICANT CHANGE UP (ref 7–23)
CALCIUM SERPL-MCNC: 8.2 MG/DL — LOW (ref 8.5–10.1)
CHLORIDE SERPL-SCNC: 107 MMOL/L — SIGNIFICANT CHANGE UP (ref 96–108)
CO2 SERPL-SCNC: 29 MMOL/L — SIGNIFICANT CHANGE UP (ref 22–31)
CREAT SERPL-MCNC: 0.81 MG/DL — SIGNIFICANT CHANGE UP (ref 0.5–1.3)
EGFR: 83 ML/MIN/1.73M2 — SIGNIFICANT CHANGE UP
GLUCOSE SERPL-MCNC: 132 MG/DL — HIGH (ref 70–99)
HCT VFR BLD CALC: 35.3 % — SIGNIFICANT CHANGE UP (ref 34.5–45)
HGB BLD-MCNC: 11.6 G/DL — SIGNIFICANT CHANGE UP (ref 11.5–15.5)
MCHC RBC-ENTMCNC: 30.9 PG — SIGNIFICANT CHANGE UP (ref 27–34)
MCHC RBC-ENTMCNC: 32.9 GM/DL — SIGNIFICANT CHANGE UP (ref 32–36)
MCV RBC AUTO: 94.1 FL — SIGNIFICANT CHANGE UP (ref 80–100)
PLATELET # BLD AUTO: 273 K/UL — SIGNIFICANT CHANGE UP (ref 150–400)
POTASSIUM SERPL-MCNC: 3.6 MMOL/L — SIGNIFICANT CHANGE UP (ref 3.5–5.3)
POTASSIUM SERPL-SCNC: 3.6 MMOL/L — SIGNIFICANT CHANGE UP (ref 3.5–5.3)
PROT SERPL-MCNC: 5.9 GM/DL — LOW (ref 6–8.3)
RBC # BLD: 3.75 M/UL — LOW (ref 3.8–5.2)
RBC # FLD: 14.2 % — SIGNIFICANT CHANGE UP (ref 10.3–14.5)
SODIUM SERPL-SCNC: 141 MMOL/L — SIGNIFICANT CHANGE UP (ref 135–145)
WBC # BLD: 9.28 K/UL — SIGNIFICANT CHANGE UP (ref 3.8–10.5)
WBC # FLD AUTO: 9.28 K/UL — SIGNIFICANT CHANGE UP (ref 3.8–10.5)

## 2022-06-30 PROCEDURE — 99232 SBSQ HOSP IP/OBS MODERATE 35: CPT | Mod: GC

## 2022-06-30 PROCEDURE — 99222 1ST HOSP IP/OBS MODERATE 55: CPT

## 2022-06-30 RX ADMIN — ENOXAPARIN SODIUM 40 MILLIGRAM(S): 100 INJECTION SUBCUTANEOUS at 05:33

## 2022-06-30 RX ADMIN — ATORVASTATIN CALCIUM 40 MILLIGRAM(S): 80 TABLET, FILM COATED ORAL at 21:09

## 2022-06-30 RX ADMIN — AZITHROMYCIN 255 MILLIGRAM(S): 500 TABLET, FILM COATED ORAL at 10:12

## 2022-06-30 RX ADMIN — SODIUM CHLORIDE 83 MILLILITER(S): 9 INJECTION INTRAMUSCULAR; INTRAVENOUS; SUBCUTANEOUS at 09:33

## 2022-06-30 RX ADMIN — Medication 300 MILLIGRAM(S): at 09:35

## 2022-06-30 RX ADMIN — Medication 300 MILLIGRAM(S): at 21:09

## 2022-06-30 RX ADMIN — Medication 81 MILLIGRAM(S): at 10:12

## 2022-06-30 RX ADMIN — Medication 40 MILLIEQUIVALENT(S): at 05:32

## 2022-06-30 NOTE — CONSULT NOTE ADULT - SUBJECTIVE AND OBJECTIVE BOX
Patient is a 62y old  Female who presents with a chief complaint of Weakness (30 Jun 2022 09:43)    HPI:   61 y/o female with a PMHx of HTN and HLD presents to the ED with cough, weakness and decreased po intake x8 days Pt with Fever x2 days ago Tmax 103. Pt went to Urgent Care and tested negative for COVID at that time. No abdominal pain, no chest pain, no rashes. No  symptoms. PMD: Dr. Denys Mooney. She states that she mostly had symptoms of exhaustion , chills and intermittent fevers for the past week.   She has pets at home: a Guinea pig. Here noted with hyponatremia, transamnitis, RLL, GAVIN pneumonia, was given rocephin/azithromycin, legionella ag positive.     PAST MEDICAL HISTORY:  HLD (hyperlipidemia)   HTN (hypertension).    Surgical History:  C section  bilateral knee meniscal repair    Meds: per reconciliation sheet, noted below  MEDICATIONS  (STANDING):  aspirin enteric coated 81 milliGRAM(s) Oral daily  atorvastatin Oral Tab/Cap - Peds 40 milliGRAM(s) Oral daily  azithromycin  IVPB 500 milliGRAM(s) IV Intermittent every 24 hours  enoxaparin Injectable 40 milliGRAM(s) SubCutaneous every 24 hours  labetalol 300 milliGRAM(s) Oral every 12 hours  sodium chloride 0.9%. 1000 milliLiter(s) (83 mL/Hr) IV Continuous <Continuous>      Allergies    No Known Allergies    Intolerances      Social: no smoking, no alcohol, no illegal drugs; no recent travel, no exposure to TB  FAMILY HISTORY:     no history of premature cardiovascular disease in first degree relatives    ROS: the patient denies fever, no chills, no HA, no dizziness, no sore throat, no blurry vision, no CP, no palpitations, +SOB, + cough, no abdominal pain, no diarrhea, no N/V, no dysuria, no leg pain, no claudication, no rash, no joint aches, no rectal pain or bleeding, no night sweats    All other systems reviewed and are negative    Vital Signs Last 24 Hrs  T(C): 36.9 (30 Jun 2022 15:13), Max: 37.1 (29 Jun 2022 22:04)  T(F): 98.4 (30 Jun 2022 15:13), Max: 98.7 (29 Jun 2022 22:04)  HR: 94 (30 Jun 2022 15:13) (93 - 95)  BP: 136/74 (30 Jun 2022 15:13) (133/71 - 140/80)  BP(mean): --  RR: 18 (30 Jun 2022 15:13) (18 - 18)  SpO2: 94% (30 Jun 2022 15:13) (93% - 94%)  Daily        PE:  Constitutional: NAD  HEENT: NC/AT, EOMI, PERRLA, conjunctivae clear; ears and nose atraumatic; pharynx benign  Neck: supple; thyroid not palpable  Back: no tenderness  Respiratory: decreased breath sounds  Cardiovascular: S1S2 regular, no murmurs  Abdomen: soft, not tender, not distended, positive BS; liver and spleen WNL  Genitourinary: no suprapubic tenderness  Lymphatic: no LN palpable  Musculoskeletal: no muscle tenderness, no joint swelling or tenderness  Extremities: no pedal edema  Neurological/ Psychiatric: AxOx3, Judgement and insight normal;  moving all extremities  Skin: no rashes; no palpable lesions    Labs: all available labs reviewed                        11.6 9.28  )-----------( 273      ( 30 Jun 2022 11:54 )             35.3     06-30    141  |  107  |  14  ----------------------------<  132<H>  3.6   |  29  |  0.81    Ca    8.2<L>      30 Jun 2022 11:54  Mg     2.5     06-30    TPro  5.9<L>  /  Alb  2.1<L>  /  TBili  0.8  /  DBili  x   /  AST  97<H>  /  ALT  70  /  AlkPhos  204<H>  06-30     LIVER FUNCTIONS - ( 30 Jun 2022 11:54 )  Alb: 2.1 g/dL / Pro: 5.9 gm/dL / ALK PHOS: 204 U/L / ALT: 70 U/L / AST: 97 U/L / GGT: x                 Radiology: all available radiological tests reviewed  < from: US Abdomen Upper Quadrant Right (06.28.22 @ 12:21) >  ACC: [id number] EXAM:  US ABDOMEN RT UPR QUADRANT                          PROCEDURE DATE:  06/28/2022          INTERPRETATION:  CLINICAL INFORMATION: Elevated LFTs.    COMPARISON: CT dated 06/28/2022.    TECHNIQUE: Sonography of the right upper quadrant.    FINDINGS:  Liver: Increased echogenicity. The liver measures approximately 19 cm.   There is a 1.4 cm left hepatic cyst. Normal portal venous flow.  Bile ducts: Normal caliber. Common bile duct measures 4 mm.  Gallbladder: A combination of cholelithiasis and sludge. Normal-appearing   gallbladder wall. Negative Zuniga's sign.  Pancreas: Visualized portions are within normal limits.  Right kidney: 9.7 cm. No hydronephrosis.  Ascites: None.  IVC: Visualized portions are within normal limits.    IMPRESSION:  Hepatic steatosis.  Cholelithiasis.        --- End of Report ---    < end of copied text >  < from: CT Chest No Cont (06.28.22 @ 12:03) >  ACC: [id number] EXAM:  CT CHEST                          PROCEDURE DATE:  06/28/2022          INTERPRETATION:  CLINICAL INFORMATION: 62 years  Female with weakness.    COMPARISON: CT abdomen and pelvis immediately prior    CONTRAST/COMPLICATIONS:  IVContrast: NONE examination was performed immediately following   contrast-enhanced CT abdomen and pelvis  Oral Contrast: NONE  Complications: None reported at time of study completion    PROCEDURE:  CT of the Chest was performed.  Sagittal and coronal reformats were performed.    FINDINGS:    LUNGS AND AIRWAYS: Patent central airways.  Large right lower lobe   consolidation. Medial left upper lobe infiltrate with air bronchograms.   Right middle lobe and lingular discoid atelectasis.  PLEURA: No pleural effusion.  MEDIASTINUM AND SHANELLE: 1.5 x 1.5 cm right lower paratracheal node. No   other mediastinal or left hilar adenopathy. Right hilum is obscured by   infiltrate.  VESSELS: Within normal limits.  HEART: Heart size is normal. Trace pericardial effusion.  CHEST WALL AND LOWER NECK: Within normal limits.  VISUALIZED UPPER ABDOMEN: 1.6 cm hepatic cyst.  BONES: Mild thoracic degenerative changes.    IMPRESSION:  Right lower lobe and left upper lobe pneumonia. Prominent mediastinal   lymph nodes likely reactive.    Trace pericardial effusion.    < end of copied text >    Advanced directives addressed: full resuscitation

## 2022-06-30 NOTE — PROGRESS NOTE ADULT - ATTENDING COMMENTS
Patient resting comfortably in bed and denies any abdominal pain. Patient recently visited 's uncle in Hospice last week, who was recently diagnosis with  legionella pneumonia. Patient has elevation in LFTs, bilirubin, and lipase with ultrasound revealing stones & sludge suspicious for gallstone pancreatitis.     Plan  - GI evaluation Dr. Gutierrez  - MRCP tp r/o choledocholithiases  - Trend Lfts & Lipase  - DVT ppx
Patient is resting comfortably with improvement of her fatigue. Discussed with patient gallstone pancreatitis and given admitted with pna and hasn't been experiencing any pain patient, she would not like any surgical intervention. From surgery standpoint if LFTSs, bilirubin, and lipase trend down to normal and able to tolerating diet without any pain, can follow-up outpatient setting.

## 2022-06-30 NOTE — PROGRESS NOTE ADULT - ASSESSMENT
61 y/o female with a PMHx of HTN and HLD presents to the ED with cough, weakness and decreased po intake x8 days Pt with Fever x2 days ago Tmax 103. Pt went to Urgent Care and tested negative for COVID at that time. No abdominal pain, no chest pain, no rashes. No  symptoms. PMD: Dr. Denys Mooney.  She states that she mostly had symptoms of exhaustion , chills and intermittent fevers for the past week.   She has pets at home: a Guinea pig    #Bilateral legionella  Pneumonia  -Improving  -CT Zithromax IV  -Legionella urine Ag-POSITIVE  -ID consult appreciated     #Transaminitis-Possibly due to cholelithiases  -Stable   -Abd U/S-cholelithiasis and sludge  - GI consult appreciated -Follow-up with GI as an out patient   -Surgical consult appreciated     #Hyponatremia due to PNA and dehydration:  -Improving    #Hypotension  -Resolved    -likely in the setting of Pna and dehydration   -d/c Losartan/HCTZ  -lower Labetalol to 300mg Bid    #Mild pericardial effusion   -Stable   -F/U Cardiology consult     -Dvt prophylaxix   -SCD    #Code status   -Full code    -Case was discussed with

## 2022-06-30 NOTE — CONSULT NOTE ADULT - NS ATTEND AMEND GEN_ALL_CORE FT
62 year old woman with legionella pneumonia and elevated lipase.     ACG guidelines for acute pancreatitis are typical pain (she does not have pain), imaging (no pancreatitis on imaging) and lipase elevation 3x ULN. So based on these criteria she does not have pancreatitis. Not clear why lipase elevated, but would just need repeat as outpatinet.   Mild LFT' elevation likely due to overall paitent condition, now downtrending. Ducts not dilated. Do not feel this is biliary pancreatitis, but if there is an ongoing concern MRCP can always better show ducts and pancreas.

## 2022-06-30 NOTE — CONSULT NOTE ADULT - SUBJECTIVE AND OBJECTIVE BOX
Patient is a 62y old  Female who presents with a chief complaint of Weakness (30 Jun 2022 09:43)    HPI:    This is a 61 y/o female with a PMHx of HTN and HLD presents to the ED with cough, weakness and decreased po intake x8 days Pt with Fever x2 days ago Tmax 103. Pt went to Urgent Care and tested negative for COVID at that time. Hospital course with +legionella pneumonia. GI consulted for elevated lipase/LFT's ? gallstone pancreatitis. Patient denies any abdominal pain. Denies any nausea, vomiting, diarrhea, changes in stools. LFT's with minimal elevation and normal bilirubin. CT with mild thickening of GB, normal CBD, gallstones/sludge and normal pancreas. Still endorsing cough and dyspnea on exertion with fatigue.    PAST MEDICAL & SURGICAL HISTORY:  HTN (hypertension)  HLD (hyperlipidemia)    MEDICATIONS  (STANDING):  aspirin enteric coated 81 milliGRAM(s) Oral daily  atorvastatin Oral Tab/Cap - Peds 40 milliGRAM(s) Oral daily  azithromycin  IVPB 500 milliGRAM(s) IV Intermittent every 24 hours  enoxaparin Injectable 40 milliGRAM(s) SubCutaneous every 24 hours  labetalol 300 milliGRAM(s) Oral every 12 hours  sodium chloride 0.9%. 1000 milliLiter(s) (83 mL/Hr) IV Continuous <Continuous>    MEDICATIONS  (PRN):  acetaminophen     Tablet .. 650 milliGRAM(s) Oral every 6 hours PRN Temp greater or equal to 38C (100.4F), Mild Pain (1 - 3)  aluminum hydroxide/magnesium hydroxide/simethicone Suspension 30 milliLiter(s) Oral every 4 hours PRN Dyspepsia  melatonin 3 milliGRAM(s) Oral at bedtime PRN Insomnia  ondansetron Injectable 4 milliGRAM(s) IV Push every 8 hours PRN Nausea and/or Vomiting      Allergies    No Known Allergies    Intolerances    SOCIAL HISTORY:    FAMILY HISTORY:    REVIEW OF SYSTEMS:    CONSTITUTIONAL: No weakness, fevers or chills  EYES/ENT: No visual changes;  No vertigo or throat pain   NECK: No pain or stiffness  RESPIRATORY: No cough, wheezing, hemoptysis; No shortness of breath  CARDIOVASCULAR: No chest pain or palpitations  GASTROINTESTINAL: See HPI  GENITOURINARY: No dysuria, frequency or hematuria  NEUROLOGICAL: No numbness or weakness  SKIN: No itching, burning, rashes, or lesions   PSYCH: Normal mood and affect  All other review of systems is negative unless indicated above.    Vital Signs Last 24 Hrs  T(C): 36.9 (30 Jun 2022 15:13), Max: 37.1 (29 Jun 2022 22:04)  T(F): 98.4 (30 Jun 2022 15:13), Max: 98.7 (29 Jun 2022 22:04)  HR: 94 (30 Jun 2022 15:13) (93 - 95)  BP: 136/74 (30 Jun 2022 15:13) (133/71 - 140/80)  BP(mean): --  RR: 18 (30 Jun 2022 15:13) (18 - 18)  SpO2: 94% (30 Jun 2022 15:13) (93% - 94%)    PHYSICAL EXAM:    Constitutional: No acute distress, well-developed, non-toxic appearing  HEENT: masked, good phonation, not icteric  Neck: supple, no lymphadenopathy  Respiratory: clear to ascultation bilaterally, no wheezing  Cardiovascular: S1 and S2, regular rate and rhythm, no murmurs rubs or gallops  Gastrointestinal: soft, non-tender, non-distended, +bowel sounds, no rebound or guarding, no surgical scars, no drains  Extremities: No peripheral edema, no cyanosis or clubbing  Vascular: 2+ peripheral pulses, no venous stasis  Neurological: A/O x 3, no focal deficits, no asterixis  Psychiatric: Normal mood, normal affect  Skin: No rashes, not jaundiced    LABS:                        11.6   9.28  )-----------( 273      ( 30 Jun 2022 11:54 )             35.3     06-30    141  |  107  |  14  ----------------------------<  132<H>  3.6   |  29  |  0.81    Ca    8.2<L>      30 Jun 2022 11:54  Mg     2.5     06-30    TPro  5.9<L>  /  Alb  2.1<L>  /  TBili  0.8  /  DBili  x   /  AST  97<H>  /  ALT  70  /  AlkPhos  204<H>  06-30      LIVER FUNCTIONS - ( 30 Jun 2022 11:54 )  Alb: 2.1 g/dL / Pro: 5.9 gm/dL / ALK PHOS: 204 U/L / ALT: 70 U/L / AST: 97 U/L / GGT: x             RADIOLOGY & ADDITIONAL STUDIES:  IMPRESSION:  Cholelithiasis. Gallbladder fundal wall thickening possibly   adenomyomatosis. Correlate with ultrasound.    Hepatic steatosis.    Right lower lobe pneumonia.    Trace pericardial effusion. Patient is a 62y old  Female who presents with a chief complaint of Weakness (30 Jun 2022 09:43)    HPI:    This is a 61 y/o female with a PMHx of HTN and HLD presents to the ED with cough, weakness and decreased po intake and found to have legionella pneumonia.     In terms of her cough and fevers she is doing better with antibiotics but still endoreses cough and dyspnea on exertion with fatigue. We were called for elevated lipase.      Patient denies any abdominal pain. No post prandial fullness or nausea. No vomiting. Has a good appetite and is eating. Denies any nausea, vomiting, diarrhea, changes in stools. Has no chronic issues of abdominal pain. No change in BM's. No overt signs of GI bleeding like hematemesis, melena, hematochezia.     PAST MEDICAL & SURGICAL HISTORY:  HTN (hypertension)  HLD (hyperlipidemia)    MEDICATIONS  (STANDING):  aspirin enteric coated 81 milliGRAM(s) Oral daily  atorvastatin Oral Tab/Cap - Peds 40 milliGRAM(s) Oral daily  azithromycin  IVPB 500 milliGRAM(s) IV Intermittent every 24 hours  enoxaparin Injectable 40 milliGRAM(s) SubCutaneous every 24 hours  labetalol 300 milliGRAM(s) Oral every 12 hours  sodium chloride 0.9%. 1000 milliLiter(s) (83 mL/Hr) IV Continuous <Continuous>    MEDICATIONS  (PRN):  acetaminophen     Tablet .. 650 milliGRAM(s) Oral every 6 hours PRN Temp greater or equal to 38C (100.4F), Mild Pain (1 - 3)  aluminum hydroxide/magnesium hydroxide/simethicone Suspension 30 milliLiter(s) Oral every 4 hours PRN Dyspepsia  melatonin 3 milliGRAM(s) Oral at bedtime PRN Insomnia  ondansetron Injectable 4 milliGRAM(s) IV Push every 8 hours PRN Nausea and/or Vomiting      Allergies    No Known Allergies    Intolerances    SOCIAL HISTORY:  no smoking, drinking, or drugs    FAMILY HISTORY:  no colon or stomach cancer    REVIEW OF SYSTEMS:    CONSTITUTIONAL: No weakness, fevers or chills  EYES/ENT: No visual changes;  No vertigo or throat pain   NECK: No pain or stiffness  RESPIRATORY: No cough, wheezing, hemoptysis; No shortness of breath  CARDIOVASCULAR: No chest pain or palpitations  GASTROINTESTINAL: See HPI  GENITOURINARY: No dysuria, frequency or hematuria  NEUROLOGICAL: No numbness or weakness  SKIN: No itching, burning, rashes, or lesions   PSYCH: Normal mood and affect  All other review of systems is negative unless indicated above.    Vital Signs Last 24 Hrs  T(C): 36.9 (30 Jun 2022 15:13), Max: 37.1 (29 Jun 2022 22:04)  T(F): 98.4 (30 Jun 2022 15:13), Max: 98.7 (29 Jun 2022 22:04)  HR: 94 (30 Jun 2022 15:13) (93 - 95)  BP: 136/74 (30 Jun 2022 15:13) (133/71 - 140/80)  BP(mean): --  RR: 18 (30 Jun 2022 15:13) (18 - 18)  SpO2: 94% (30 Jun 2022 15:13) (93% - 94%)    PHYSICAL EXAM:    Constitutional: No acute distress, well-developed, non-toxic appearing  HEENT: masked, good phonation, not icteric  Neck: supple, no lymphadenopathy  Respiratory: clear to ascultation bilaterally but decreased sounds at the R base, no wheezing  Cardiovascular: S1 and S2, regular rate and rhythm, no murmurs rubs or gallops  Gastrointestinal: soft, non-tender, non-distended, +bowel sounds, no rebound or guarding, no surgical scars, no drains  Extremities: No peripheral edema, no cyanosis or clubbing  Vascular: 2+ peripheral pulses, no venous stasis  Neurological: A/O x 3, no focal deficits, no asterixis  Psychiatric: Normal mood, normal affect  Skin: No rashes, not jaundiced    LABS:                        11.6   9.28  )-----------( 273      ( 30 Jun 2022 11:54 )             35.3     06-30    141  |  107  |  14  ----------------------------<  132<H>  3.6   |  29  |  0.81    Ca    8.2<L>      30 Jun 2022 11:54  Mg     2.5     06-30    TPro  5.9<L>  /  Alb  2.1<L>  /  TBili  0.8  /  DBili  x   /  AST  97<H>  /  ALT  70  /  AlkPhos  204<H>  06-30      LIVER FUNCTIONS - ( 30 Jun 2022 11:54 )  Alb: 2.1 g/dL / Pro: 5.9 gm/dL / ALK PHOS: 204 U/L / ALT: 70 U/L / AST: 97 U/L / GGT: x             RADIOLOGY & ADDITIONAL STUDIES:  IMPRESSION:  Cholelithiasis. Gallbladder fundal wall thickening possibly   adenomyomatosis. Correlate with ultrasound.    Hepatic steatosis.    Right lower lobe pneumonia.    Trace pericardial effusion.

## 2022-06-30 NOTE — CDI QUERY NOTE - NSCDIOTHERTXTBX2_GEN_ALL_CORE_FT
This patient is documented to have Sepsis.      This patient does not appear to meet Northwell's sepsis criteria using SIRS (Temp. >101F or <96.8F, HR>90bpm, RR>20/min or PaCO2<32mmHg, WBC>12,000 or <4000 or Bands>10% Unexplained altered mental status).    Can you please clarify the status of the sepsis, after review?    A) Sepsis ruled out  B) Sepsis ruled in (please also document the criteria you are using to support the diagnosis of sepsis)  C) Other (please specify)_____________.   D) Unable to determine     Chart Documentation/Evidence:    Vital signs on admission   T(F): 99.1 (28 Jun 2022 10:04), Max: 99.1 (28 Jun 2022 10:04)  HR: 96 (28 Jun 2022 10:04) (96 - 96)  RR: 18 (28 Jun 2022 10:04) (18 - 18)    WBC Count: 8.50 K/uL (06.28.22 @ 10:43)     Adult-Family progress note 6/29/2022   Transaminitis  -? transaminitis due to sepsis (possible Legionella Pna)

## 2022-06-30 NOTE — CDI QUERY NOTE - NSCDIOTHERTXTBX_GEN_ALL_CORE_HH
There is conflicting documentation noted in the medical record as to the cause of the transaminitis. The documentation in this medical record requires additional clarification to accurately capture the patient’s diagnosis/diagnoses, treatment and or severity of illness. Please document all corresponding diagnoses, either known or suspected, of the clinical indicators described below.    A) Gallstone Pancreatitis   B) Transaminitis due to Legionella pneumonia   C) Other (please specify) ___________.   D) Unable to determine    CLINICAL INDICATORS:    CT Abdomen and Pelvis w/ IV Cont (06.28.22 @ 12:03)   PANCREAS: Within normal limits.  Cholelithiasis. Gallbladder fundal wall thickening possibly adenomyomatosis. Correlate with ultrasound.    US Abdomen Upper Quadrant Right (06.28.22 @ 12:21)   Hepatic steatosis. Cholelithiasis.    Lipase, Serum 1047 U/L (06.29.22 @ 11:57)   Lipase, Serum: 1072 U/L (06.28.22 @ 10:43)     Adult surgery progress note 6/30/2022  Gallstone pancreatitis and hasn't been experiencing any pain patient, she would not like any surgical intervention. From surgery standpoint if LFTSs, bilirubin, and lipase trend down to normal and able to tolerating diet without any pain, can follow-up outpatient setting.     Adult-family progress note 6/22/2022  -? transaminitis due to sepsis (possible Legionella Pna)  -Abd U/S-cholelithiasis and sludge

## 2022-06-30 NOTE — PROGRESS NOTE ADULT - ASSESSMENT
62F admitted for weakness and fever presents with findings of cholelithiasis and elevated LFTs. Likely underlying gallstone pancreatitis and possible recent hx of choledocholithiasis.      recommendations:  CLD  GI evaluation  IV abx  medical management of pneumonia  MRCP  trend LFTs and lipase  serial abdominal exams      Plan discussed with Dr. Sr

## 2022-06-30 NOTE — CONSULT NOTE ADULT - ASSESSMENT
62 year old female with legionella pneumonia with likely infection related elevation in LFT's as all are now normalizing with normal bilirubin. Lipase is not >3x upper limit normal and patient does not have any abdominal pain thus not meeting criteria for acute pancreatitis.     Would recommend resuming low fat diet and continue abx etc per med recs.   No emergent need for GI intervention or further imaging at this time.    Plan:  ::Resume diet  ::Supportive measures    Plan discussed with Dr. Gutierrez 62 year old female with legionella pneumonia with likely infection related elevation in LFT's as all are now normalizing with normal bilirubin. Lipase elevated butl patient does not have any abdominal pain and no CT evidence thus not meeting criteria for acute pancreatitis.     Would recommend resuming low fat diet and continue abx etc per med recs.   No emergent need for GI intervention or further imaging at this time.    Plan:  ::Resume diet  ::Supportive measures    Plan discussed with Dr. Gutierrez

## 2022-06-30 NOTE — CONSULT NOTE ADULT - ASSESSMENT
61 y/o female with a PMHx of HTN and HLD presents to the ED with cough, weakness and decreased po intake x8 days Pt with Fever x2 days ago Tmax 103. Pt went to Urgent Care and tested negative for COVID at that time. No abdominal pain, no chest pain, no rashes. No  symptoms. PMD: Dr. Denys Mooney. She states that she mostly had symptoms of exhaustion , chills and intermittent fevers for the past week.   She has pets at home: a Guinea pig. Here noted with hyponatremia, transamnitis, RLL, GAVIN pneumonia, was given rocephin/azithromycin, legionella ag positive.    1. Legionella pneumonia  - legionella causes transamnitis, hyponatremia and her lung findings  - on azithromycin 500mg daily #3  - complete 10 day course  - monitor temps  - tolerating abx well so far; no side effects noted  - reason for abx use and side effects reviewed with patient  - supportive care  - fu cbc    2. other issues - care per medicine

## 2022-07-01 ENCOUNTER — TRANSCRIPTION ENCOUNTER (OUTPATIENT)
Age: 62
End: 2022-07-01

## 2022-07-01 VITALS — DIASTOLIC BLOOD PRESSURE: 88 MMHG | SYSTOLIC BLOOD PRESSURE: 153 MMHG | HEART RATE: 91 BPM

## 2022-07-01 PROCEDURE — 99239 HOSP IP/OBS DSCHRG MGMT >30: CPT | Mod: GC

## 2022-07-01 RX ORDER — AZITHROMYCIN 500 MG/1
1 TABLET, FILM COATED ORAL
Qty: 6 | Refills: 0
Start: 2022-07-01 | End: 2022-07-06

## 2022-07-01 RX ADMIN — Medication 300 MILLIGRAM(S): at 10:21

## 2022-07-01 RX ADMIN — Medication 81 MILLIGRAM(S): at 13:00

## 2022-07-01 RX ADMIN — ENOXAPARIN SODIUM 40 MILLIGRAM(S): 100 INJECTION SUBCUTANEOUS at 05:47

## 2022-07-01 RX ADMIN — AZITHROMYCIN 255 MILLIGRAM(S): 500 TABLET, FILM COATED ORAL at 10:21

## 2022-07-01 NOTE — PROGRESS NOTE ADULT - ASSESSMENT
61 y/o female with a PMHx of HTN and HLD presents to the ED with cough, weakness and decreased po intake x8 days Pt with Fever x2 days ago Tmax 103. Pt went to Urgent Care and tested negative for COVID at that time. No abdominal pain, no chest pain, no rashes. No  symptoms. PMD: Dr. Denys Mooney.  She states that she mostly had symptoms of exhaustion , chills and intermittent fevers for the past week.   She has pets at home: a Guinea pig  postive for legionella   trace pericardial effusion seen on CT chest   Just saw Dr Villaseñor and a full cardiac moran in [facility]   No active cardiac issues   cont labetolol for HTN   call us back for further questions

## 2022-07-01 NOTE — DISCHARGE NOTE PROVIDER - NSDCMRMEDTOKEN_GEN_ALL_CORE_FT
Aspirin Enteric Coated 81 mg oral delayed release tablet: 1 tab(s) orally once a day  atorvastatin 40 mg oral tablet: 1 tab(s) orally once a day  azithromycin 500 mg oral tablet: 1 tab(s) orally once a day  labetalol 300 mg oral tablet: 2 tab(s) orally 2 times a day  losartan-hydrochlorothiazide 100 mg-25 mg oral tablet: 1 tab(s) orally once a day

## 2022-07-01 NOTE — PROGRESS NOTE ADULT - ASSESSMENT
62F admitted for weakness and fever presents with findings of cholelithiasis and elevated LFTs. Likely underlying gallstone pancreatitis and possible recent hx of choledocholithiasis.      recommendations:  continue regular diet as tolerating.   F/u GI recommendations   IV abx  medical management of pneumonia  No surgical intervention needed, surgery will sign off this patient, please reconsult again if needed      Plan discussed with Dr. Sr

## 2022-07-01 NOTE — PROGRESS NOTE ADULT - ASSESSMENT
61 y/o female with a PMHx of HTN and HLD presents to the ED with cough, weakness and decreased po intake x8 days Pt with Fever x2 days ago Tmax 103. Pt went to Urgent Care and tested negative for COVID at that time. No abdominal pain, no chest pain, no rashes. No  symptoms. PMD: Dr. Denys Mooney. She states that she mostly had symptoms of exhaustion , chills and intermittent fevers for the past week.   She has pets at home: a Guinea pig. Here noted with hyponatremia, transamnitis, RLL, GAVIN pneumonia, was given rocephin/azithromycin, legionella ag positive.    1. Legionella pneumonia  - legionella causes transamnitis, hyponatremia and her lung findings  - on azithromycin 500mg daily #4  - complete 10 day course  - monitor temps  - tolerating abx well so far; no side effects noted  - reason for abx use and side effects reviewed with patient  - supportive care  - fu cbc    2. other issues - care per medicine

## 2022-07-01 NOTE — DISCHARGE NOTE NURSING/CASE MANAGEMENT/SOCIAL WORK - PATIENT PORTAL LINK FT
You can access the FollowMyHealth Patient Portal offered by [facility] by registering at the following website: [email or web address]. By joining [employer]’s FollowMyHealth portal, you will also be able to view your health information using other applications (apps) compatible with our system.

## 2022-07-01 NOTE — DISCHARGE NOTE NURSING/CASE MANAGEMENT/SOCIAL WORK - NSDCPEFALRISK_GEN_ALL_CORE
For information on Fall & Injury Prevention, visit: [email or web address] OR  [email or web address] OR  https://www.cdc.gov/steadi/patient.html

## 2022-07-01 NOTE — PROGRESS NOTE ADULT - SUBJECTIVE AND OBJECTIVE BOX
63 y/o female with a PMHx of HTN and HLD presents to the ED with cough, weakness and decreased po intake x8 days Pt with Fever x2 days ago Tmax 103. Pt went to Urgent Care and tested negative for COVID at that time. No abdominal pain, no chest pain, no rashes. No  symptoms. PMD: Dr. Denys Mooney.  She states that she mostly had symptoms of exhaustion , chills and intermittent fevers for the past week.   She has pets at home: a Guinea pig    06/29-Pt was seen by his bed side, awake and oriented x 3. not in acute distress. Pt is feeling better since admission     REVIEW OF SYSTEMS:    CONSTITUTIONAL: + weakness, no fevers or chills  EYES/ENT: No visual changes;  No vertigo or throat pain   NECK: No pain or stiffness  RESPIRATORY: No cough, wheezing, hemoptysis; No shortness of breath  CARDIOVASCULAR: No chest pain or palpitations  GASTROINTESTINAL: No abdominal or epigastric pain. No nausea, vomiting, or hematemesis; No diarrhea or constipation. No melena or hematochezia.  GENITOURINARY: No dysuria, frequency or hematuria  NEUROLOGICAL: No numbness or weakness  SKIN: No itching, rashes    Vital Signs Last 24 Hrs  T(C): 36.9 (30 Jun 2022 15:13), Max: 37.1 (29 Jun 2022 22:04)  T(F): 98.4 (30 Jun 2022 15:13), Max: 98.7 (29 Jun 2022 22:04)  HR: 94 (30 Jun 2022 15:13) (93 - 95)  BP: 136/74 (30 Jun 2022 15:13) (133/71 - 140/80)  BP(mean): --  RR: 18 (30 Jun 2022 15:13) (18 - 18)  SpO2: 94% (30 Jun 2022 15:13) (93% - 94%)    Examination -    GENERAL: NAD, lying in bed comfortably  HEAD:  Atraumatic, Normocephalic  EYES: EOMI, PERRLA, conjunctiva and sclera clear  ENT: Moist mucous membranes  NECK: Supple, No JVD  CHEST/LUNG: Decreased breath sounds in the right lower lung  HEART: Regular rate and rhythm; No murmurs, rubs, or gallops  ABDOMEN: Bowel sounds present; Soft, Nontender, Nondistended. No hepatomegally  EXTREMITIES:  2+ Peripheral Pulses, brisk capillary refill. No clubbing, cyanosis, or edema  NERVOUS SYSTEM:  Alert & Oriented X3, speech clear. No deficits   MSK: FROM all 4 extremities, full and equal strength  SKIN: No rashes or lesions                                              11.6   9.28  )-----------( 273      ( 30 Jun 2022 11:54 )             35.3     06-30    141  |  107  |  14  ----------------------------<  132<H>  3.6   |  29  |  0.81    Ca    8.2<L>      30 Jun 2022 11:54  Mg     2.5     06-30    TPro  5.9<L>  /  Alb  2.1<L>  /  TBili  0.8  /  DBili  x   /  AST  97<H>  /  ALT  70  /  AlkPhos  204<H>  06-30      MEDICATIONS  (STANDING):  aspirin enteric coated 81 milliGRAM(s) Oral daily  atorvastatin Oral Tab/Cap - Peds 40 milliGRAM(s) Oral daily  azithromycin  IVPB 500 milliGRAM(s) IV Intermittent every 24 hours  enoxaparin Injectable 40 milliGRAM(s) SubCutaneous every 24 hours  labetalol 300 milliGRAM(s) Oral every 12 hours  sodium chloride 0.9%. 1000 milliLiter(s) (83 mL/Hr) IV Continuous <Continuous>    MEDICATIONS  (PRN):  acetaminophen     Tablet .. 650 milliGRAM(s) Oral every 6 hours PRN Temp greater or equal to 38C (100.4F), Mild Pain (1 - 3)  aluminum hydroxide/magnesium hydroxide/simethicone Suspension 30 milliLiter(s) Oral every 4 hours PRN Dyspepsia  melatonin 3 milliGRAM(s) Oral at bedtime PRN Insomnia  ondansetron Injectable 4 milliGRAM(s) IV Push every 8 hours PRN Nausea and/or Vomiting      
Pt has been seen and examined with FP resident, resident supervised agree with a/p       Patient is a 62y old  Female who presents with a chief complaint of Weakness (29 Jun 2022 10:31)      PHYSICAL EXAM:    -rs-aeeb, cta  -cvs-s1s2 normal   -p/a-soft,bs+      A/P    #ct supportive care     #dvt pr     #ct abx 
Pt has been seen and examined with FP resident, resident supervised agree with a/p       Patient is a 62y old  Female who presents with a chief complaint of Weakness (29 Jun 2022 10:31)      PHYSICAL EXAM:  Vital Signs Last 24 Hrs  T(C): 36.8 (29 Jun 2022 15:32), Max: 37.2 (28 Jun 2022 21:13)  T(F): 98.3 (29 Jun 2022 15:32), Max: 99 (28 Jun 2022 21:13)  HR: 87 (29 Jun 2022 15:32) (81 - 95)  BP: 127/79 (29 Jun 2022 15:32) (124/67 - 150/86)  BP(mean): --  RR: 18 (29 Jun 2022 15:32) (18 - 18)  SpO2: 95% (29 Jun 2022 15:32) (91% - 95%)  -rs-aeeb, cta  -cvs-s1s2 normal   -p/a-soft,bs+      A/P    #ct supportive care     #dvt pr     #ct abx 
  63 y/o female with a PMHx of HTN and HLD presents to the ED with cough, weakness and decreased po intake x8 days Pt with Fever x2 days ago Tmax 103. Pt went to Urgent Care and tested negative for COVID at that time. No abdominal pain, no chest pain, no rashes. No  symptoms. PMD: Dr. Denys Mooney.  She states that she mostly had symptoms of exhaustion , chills and intermittent fevers for the past week.   She has pets at home: a Guinea pig    06/29-Pt was seen by his bed side, awake and oriented x 3. not in acute distress. Pt is feeling better since admission     REVIEW OF SYSTEMS:    CONSTITUTIONAL: + weakness, no fevers or chills  EYES/ENT: No visual changes;  No vertigo or throat pain   NECK: No pain or stiffness  RESPIRATORY: No cough, wheezing, hemoptysis; No shortness of breath  CARDIOVASCULAR: No chest pain or palpitations  GASTROINTESTINAL: No abdominal or epigastric pain. No nausea, vomiting, or hematemesis; No diarrhea or constipation. No melena or hematochezia.  GENITOURINARY: No dysuria, frequency or hematuria  NEUROLOGICAL: No numbness or weakness  SKIN: No itching, rashes    Examination -    GENERAL: NAD, lying in bed comfortably  HEAD:  Atraumatic, Normocephalic  EYES: EOMI, PERRLA, conjunctiva and sclera clear  ENT: Moist mucous membranes  NECK: Supple, No JVD  CHEST/LUNG: Decreased breath sounds in the right lower lung  HEART: Regular rate and rhythm; No murmurs, rubs, or gallops  ABDOMEN: Bowel sounds present; Soft, Nontender, Nondistended. No hepatomegally  EXTREMITIES:  2+ Peripheral Pulses, brisk capillary refill. No clubbing, cyanosis, or edema  NERVOUS SYSTEM:  Alert & Oriented X3, speech clear. No deficits   MSK: FROM all 4 extremities, full and equal strength  SKIN: No rashes or lesions                          14.4   8.50  )-----------( 234      ( 28 Jun 2022 10:43 )             41.7   06-29    136  |  102  |  18  ----------------------------<  142<H>  2.9<LL>   |  28  |  0.88    Ca    8.3<L>      29 Jun 2022 11:57    TPro  6.2  /  Alb  2.2<L>  /  TBili  1.0  /  DBili  x   /  AST  138<H>  /  ALT  78  /  AlkPhos  209<H>  06-29    MEDICATIONS  (STANDING):  aspirin enteric coated 81 milliGRAM(s) Oral daily  atorvastatin Oral Tab/Cap - Peds 40 milliGRAM(s) Oral daily  azithromycin  IVPB 500 milliGRAM(s) IV Intermittent every 24 hours  enoxaparin Injectable 40 milliGRAM(s) SubCutaneous every 24 hours  labetalol 300 milliGRAM(s) Oral every 12 hours  potassium chloride    Tablet ER 40 milliEquivalent(s) Oral every 4 hours  sodium chloride 0.9%. 1000 milliLiter(s) (83 mL/Hr) IV Continuous <Continuous>    MEDICATIONS  (PRN):  acetaminophen     Tablet .. 650 milliGRAM(s) Oral every 6 hours PRN Temp greater or equal to 38C (100.4F), Mild Pain (1 - 3)  aluminum hydroxide/magnesium hydroxide/simethicone Suspension 30 milliLiter(s) Oral every 4 hours PRN Dyspepsia  melatonin 3 milliGRAM(s) Oral at bedtime PRN Insomnia  ondansetron Injectable 4 milliGRAM(s) IV Push every 8 hours PRN Nausea and/or Vomiting    
Date of service: 07-01-22 @ 09:55    pt seen and examined   feels better   has some cough  no resp distress  afebrile     ROS: no fever or chills; denies dizziness, no HA, no abdominal pain, no diarrhea or constipation; no dysuria, no urinary frequency, no legs pain, no rashes    MEDICATIONS  (STANDING):  aspirin enteric coated 81 milliGRAM(s) Oral daily  atorvastatin Oral Tab/Cap - Peds 40 milliGRAM(s) Oral daily  azithromycin  IVPB 500 milliGRAM(s) IV Intermittent every 24 hours  enoxaparin Injectable 40 milliGRAM(s) SubCutaneous every 24 hours  labetalol 300 milliGRAM(s) Oral every 12 hours    Vital Signs Last 24 Hrs  T(C): 37 (01 Jul 2022 08:09), Max: 37.3 (30 Jun 2022 23:20)  T(F): 98.6 (01 Jul 2022 08:09), Max: 99.1 (30 Jun 2022 23:20)  HR: 82 (01 Jul 2022 08:09) (82 - 94)  BP: 155/97 (01 Jul 2022 08:09) (136/74 - 157/89)  BP(mean): --  RR: 18 (01 Jul 2022 08:09) (18 - 18)  SpO2: 93% (01 Jul 2022 08:09) (93% - 94%)    PE:  Constitutional: NAD  HEENT: NC/AT, EOMI, PERRLA, conjunctivae clear; ears and nose atraumatic; pharynx benign  Neck: supple; thyroid not palpable  Back: no tenderness  Respiratory: decreased breath sounds  Cardiovascular: S1S2 regular, no murmurs  Abdomen: soft, not tender, not distended, positive BS; liver and spleen WNL  Genitourinary: no suprapubic tenderness  Lymphatic: no LN palpable  Musculoskeletal: no muscle tenderness, no joint swelling or tenderness  Extremities: no pedal edema  Neurological/ Psychiatric: AxOx3, Judgement and insight normal;  moving all extremities  Skin: no rashes; no palpable lesions    Labs: all available labs reviewed                                   11.6   9.28  )-----------( 273      ( 30 Jun 2022 11:54 )             35.3     06-30    141  |  107  |  14  ----------------------------<  132<H>  3.6   |  29  |  0.81    Ca    8.2<L>      30 Jun 2022 11:54  Mg     2.5     06-30    TPro  5.9<L>  /  Alb  2.1<L>  /  TBili  0.8  /  DBili  x   /  AST  97<H>  /  ALT  70  /  AlkPhos  204<H>  06-30          Radiology: all available radiological tests reviewed    ACC: [id number] EXAM:  US ABDOMEN RT UPR QUADRANT                          PROCEDURE DATE:  06/28/2022          INTERPRETATION:  CLINICAL INFORMATION: Elevated LFTs.    COMPARISON: CT dated 06/28/2022.    TECHNIQUE: Sonography of the right upper quadrant.    FINDINGS:  Liver: Increased echogenicity. The liver measures approximately 19 cm.   There is a 1.4 cm left hepatic cyst. Normal portal venous flow.  Bile ducts: Normal caliber. Common bile duct measures 4 mm.  Gallbladder: A combination of cholelithiasis and sludge. Normal-appearing   gallbladder wall. Negative Zuniga's sign.  Pancreas: Visualized portions are within normal limits.  Right kidney: 9.7 cm. No hydronephrosis.  Ascites: None.  IVC: Visualized portions are within normal limits.    IMPRESSION:  Hepatic steatosis.  Cholelithiasis.      PROCEDURE DATE:  06/28/2022          INTERPRETATION:  CLINICAL INFORMATION: 62 years  Female with weakness.    COMPARISON: CT abdomen and pelvis immediately prior    CONTRAST/COMPLICATIONS:  IVContrast: NONE examination was performed immediately following   contrast-enhanced CT abdomen and pelvis  Oral Contrast: NONE  Complications: None reported at time of study completion    PROCEDURE:  CT of the Chest was performed.  Sagittal and coronal reformats were performed.    FINDINGS:    LUNGS AND AIRWAYS: Patent central airways.  Large right lower lobe   consolidation. Medial left upper lobe infiltrate with air bronchograms.   Right middle lobe and lingular discoid atelectasis.  PLEURA: No pleural effusion.  MEDIASTINUM AND SHANELLE: 1.5 x 1.5 cm right lower paratracheal node. No   other mediastinal or left hilar adenopathy. Right hilum is obscured by   infiltrate.  VESSELS: Within normal limits.  HEART: Heart size is normal. Trace pericardial effusion.  CHEST WALL AND LOWER NECK: Within normal limits.  VISUALIZED UPPER ABDOMEN: 1.6 cm hepatic cyst.  BONES: Mild thoracic degenerative changes.    IMPRESSION:  Right lower lobe and left upper lobe pneumonia. Prominent mediastinal   lymph nodes likely reactive.    Trace pericardial effusion.    < end of copied text >    Advanced directives addressed: full resuscitation
Patient was seen and examined today bedside, patient denied any pain ,nausea, vomiting, fever or chills. Patient was found to have leginoella pneumonia. Vitally stable, no acute events were reported from nursing staff.    ICU Vital Signs Last 24 Hrs  T(C): 36.8 (29 Jun 2022 07:43), Max: 37.2 (28 Jun 2022 21:13)  T(F): 98.2 (29 Jun 2022 07:43), Max: 99 (28 Jun 2022 21:13)  HR: 81 (29 Jun 2022 07:43) (81 - 96)  BP: 150/86 (29 Jun 2022 07:43) (124/67 - 150/86)  BP(mean): 105 (28 Jun 2022 16:18) (105 - 105)  ABP: --  ABP(mean): --  RR: 18 (29 Jun 2022 07:43) (16 - 18)  SpO2: 92% (29 Jun 2022 07:43) (91% - 95%)      PHYSICAL EXAM:    GENERAL: NAD  HEENT:  NC/AT, EOMI, PERRLA, No scleral icterus, Moist mucous membranes  NECK: Supple, No JVD  CNS:  Alert & Oriented X3, Motor Strength 5/5 B/L upper and lower extremities; DTRs 2+ intact   LUNG: Normal Breath sounds, +bilateral  rales, No rhonchi, No wheezing  HEART: RRR; No murmurs, No rubs  ABDOMEN: +BS, ST/ND/NT                                     14.4   8.50  )-----------( 234      ( 28 Jun 2022 10:43 )             41.7   06-28    132<L>  |  93<L>  |  24<H>  ----------------------------<  115<H>  3.5   |  32<H>  |  1.20    Ca    9.0      28 Jun 2022 10:43    TPro  7.5  /  Alb  2.5<L>  /  TBili  1.6<H>  /  DBili  x   /  AST  192<H>  /  ALT  89<H>  /  AlkPhos  196<H>  06-28    
Patient was seen and examined today bedside, patient denied any pain ,nausea, vomiting, fever or chills. Patient was found to have leginoella pneumonia. Vitally stable, no acute events were reported from nursing staff.    ICU Vital Signs Last 24 Hrs  T(C): 37 (01 Jul 2022 08:09), Max: 37.3 (30 Jun 2022 23:20)  T(F): 98.6 (01 Jul 2022 08:09), Max: 99.1 (30 Jun 2022 23:20)  HR: 91 (01 Jul 2022 10:20) (82 - 94)  BP: 153/88 (01 Jul 2022 10:20) (136/74 - 157/89)  BP(mean): --  ABP: --  ABP(mean): --  RR: 18 (01 Jul 2022 08:09) (18 - 18)  SpO2: 93% (01 Jul 2022 08:09) (93% - 94%)        PHYSICAL EXAM:    GENERAL: NAD  HEENT:  NC/AT, EOMI, PERRLA, No scleral icterus, Moist mucous membranes  NECK: Supple, No JVD  CNS:  Alert & Oriented X3, Motor Strength 5/5 B/L upper and lower extremities; DTRs 2+ intact   LUNG: Normal Breath sounds, +bilateral  rales, No rhonchi, No wheezing  HEART: RRR; No murmurs, No rubs  ABDOMEN: +BS, ST/ND/NT                              11.6   9.28  )-----------( 273      ( 30 Jun 2022 11:54 )             35.3   06-30    141  |  107  |  14  ----------------------------<  132<H>  3.6   |  29  |  0.81    Ca    8.2<L>      30 Jun 2022 11:54  Mg     2.5     06-30    TPro  5.9<L>  /  Alb  2.1<L>  /  TBili  0.8  /  DBili  x   /  AST  97<H>  /  ALT  70  /  AlkPhos  204<H>  06-30    
Patient was seen and examined today bedside, patient denied any pain ,nausea, vomiting, fever or chills. Patient was found to have leginoella pneumonia. Vitally stable, no acute events were reported from nursing staff.    ICU Vital Signs Last 24 Hrs  T(C): 36.8 (30 Jun 2022 08:36), Max: 37.1 (29 Jun 2022 22:04)  T(F): 98.2 (30 Jun 2022 08:36), Max: 98.7 (29 Jun 2022 22:04)  HR: 95 (30 Jun 2022 08:36) (87 - 95)  BP: 133/71 (30 Jun 2022 08:36) (127/79 - 140/80)  BP(mean): --  ABP: --  ABP(mean): --  RR: 18 (30 Jun 2022 08:36) (18 - 18)  SpO2: 93% (30 Jun 2022 08:36) (93% - 95%)    PHYSICAL EXAM:    GENERAL: NAD  HEENT:  NC/AT, EOMI, PERRLA, No scleral icterus, Moist mucous membranes  NECK: Supple, No JVD  CNS:  Alert & Oriented X3, Motor Strength 5/5 B/L upper and lower extremities; DTRs 2+ intact   LUNG: Normal Breath sounds, +bilateral  rales, No rhonchi, No wheezing  HEART: RRR; No murmurs, No rubs  ABDOMEN: +BS, ST/ND/NT                                   14.4   8.50  )-----------( 234      ( 28 Jun 2022 10:43 )             41.7   06-29    136  |  102  |  18  ----------------------------<  142<H>  2.9<LL>   |  28  |  0.88    Ca    8.3<L>      29 Jun 2022 11:57    TPro  6.2  /  Alb  2.2<L>  /  TBili  1.0  /  DBili  x   /  AST  138<H>  /  ALT  78  /  AlkPhos  209<H>  06-29      
Patient is a 62y old  Female who presents with a chief complaint of Weakness (30 Jun 2022 09:43)      HPI:    63 y/o female with a PMHx of HTN and HLD presents to the ED with cough, weakness and decreased po intake x8 days Pt with Fever x2 days ago Tmax 103. Pt went to Urgent Care and tested negative for COVID at that time. No abdominal pain, no chest pain, no rashes. No  symptoms. PMD: Dr. Denys Mooney.  She states that she mostly had symptoms of exhaustion , chills and intermittent fevers for the past week.   She has pets at home: a Guinea pig  postive for legionella   trace pericardial effusion seen on CT chest   PAST MEDICAL HISTORY:  HLD (hyperlipidemia)   HTN (hypertension).    Surgical History:  C section  bilateral knee meniscal repair    Social History:  no smoking, no Etoh    Family History:   father had massive MI age 63        REVIEW OF SYSTEMS:    CONSTITUTIONAL: No weakness, +fevers, +chills  ENT: No ear ache, No sorethroat  NECK: No pain, No stiffness  RESPIRATORY: No cough, No wheezing, No hemoptysis; No dyspnea  CARDIOVASCULAR: No chest pain, No palpitations  GASTROINTESTINAL: No abd pain, No nausea, No vomiting, No hematemesis, No diarrhea or constipation. No melena, No hematochezia.  GENITOURINARY: No dysuria, No  hematuria  NEUROLOGICAL: No diplopia, No paresthesia, No motor dysfunction  MUSCULOSKELETAL: No arthralgia, No myalgia  SKIN: No rashes, or lesions   PSYCH: no anxiety, no suicidal ideation    All other review of systems is negative unless indicated above    Vital Signs Last 24 Hrs  T(C): 37.3 (28 Jun 2022 10:04), Max: 37.3 (28 Jun 2022 10:04)  T(F): 99.1 (28 Jun 2022 10:04), Max: 99.1 (28 Jun 2022 10:04)  HR: 96 (28 Jun 2022 10:04) (96 - 96)  BP: 100/83 (28 Jun 2022 10:04) (100/83 - 100/83)  BP(mean): 89 (28 Jun 2022 10:04) (89 - 89)  RR: 18 (28 Jun 2022 10:04) (18 - 18)  SpO2: 92% (28 Jun 2022 10:04) (92% - 92%)    PHYSICAL EXAM:    GENERAL: NAD  HEENT:  NC/AT, EOMI, PERRLA, No scleral icterus, Moist mucous membranes  NECK: Supple, No JVD  CNS:  Alert & Oriented X3, Motor Strength 5/5 B/L upper and lower extremities; DTRs 2+ intact   LUNG: Normal Breath sounds, +bilateral  rales, No rhonchi, No wheezing  HEART: RRR; No murmurs, No rubs  ABDOMEN: +BS, ST/ND/NT  GENITOURINARY: Voiding, Bladder not distended  EXTREMITIES:  2+ Peripheral Pulses, No clubbing, No cyanosis, No tibial edema  MUSCULOSKELTAL: Joints normal ROM, No TTP, No effusion  SKIN: no rashes  RECTAL: deferred, not indicated  BREAST: deferred                          14.4   8.50  )-----------( 234      ( 28 Jun 2022 10:43 )             41.7     06-28    132<L>  |  93<L>  |  24<H>  ----------------------------<  115<H>  3.5   |  32<H>  |  1.20    Ca    9.0      28 Jun 2022 10:43    TPro  7.5  /  Alb  2.5<L>  /  TBili  1.6<H>  /  DBili  x   /  AST  192<H>  /  ALT  89<H>  /  AlkPhos  196<H>  06-28      a/p:    1. Bilateral Pna:  In view of abnormal LFTs will consider Legionella Pna in the differential   Ceftriaxone / Zithromax IV  f/u Blood cx  Legionella urine Ag    2. Transaminitis:  unlikely due to GB pathology; surgical eval noted  +hepatic steatosis on CT and US  will check hepatitis panel  will consider transaminitis due to sepsis (possible Legionella Pna)    3. Hyponatremia due to Pna and dehydration:  NS at 75cc/hr x 24hrs   stop HCTZ    4. Hypotension:  likely in the setting of Pna and dehydration   d/c Losartan/HCTZ  lower Labetalol to 300mg Bid  reassess in am     (28 Jun 2022 15:20)      PAST MEDICAL & SURGICAL HISTORY:  HTN (hypertension)      HLD (hyperlipidemia)                                        MEDICATIONS  (STANDING):  aspirin enteric coated 81 milliGRAM(s) Oral daily  atorvastatin Oral Tab/Cap - Peds 40 milliGRAM(s) Oral daily  azithromycin  IVPB 500 milliGRAM(s) IV Intermittent every 24 hours  enoxaparin Injectable 40 milliGRAM(s) SubCutaneous every 24 hours  labetalol 300 milliGRAM(s) Oral every 12 hours    MEDICATIONS  (PRN):  acetaminophen     Tablet .. 650 milliGRAM(s) Oral every 6 hours PRN Temp greater or equal to 38C (100.4F), Mild Pain (1 - 3)  aluminum hydroxide/magnesium hydroxide/simethicone Suspension 30 milliLiter(s) Oral every 4 hours PRN Dyspepsia  melatonin 3 milliGRAM(s) Oral at bedtime PRN Insomnia  ondansetron Injectable 4 milliGRAM(s) IV Push every 8 hours PRN Nausea and/or Vomiting      FAMILY HISTORY:      SOCIAL HISTORY:    CIGARETTES:        Vital Signs Last 24 Hrs  T(C): 37 (01 Jul 2022 08:09), Max: 37.3 (30 Jun 2022 23:20)  T(F): 98.6 (01 Jul 2022 08:09), Max: 99.1 (30 Jun 2022 23:20)  HR: 82 (01 Jul 2022 08:09) (82 - 94)  BP: 155/97 (01 Jul 2022 08:09) (136/74 - 157/89)  BP(mean): --  RR: 18 (01 Jul 2022 08:09) (18 - 18)  SpO2: 93% (01 Jul 2022 08:09) (93% - 94%)            INTERPRETATION OF TELEMETRY:    ECG:    I&O's Detail      LABS:                        11.6   9.28  )-----------( 273      ( 30 Jun 2022 11:54 )             35.3     06-30    141  |  107  |  14  ----------------------------<  132<H>  3.6   |  29  |  0.81    Ca    8.2<L>      30 Jun 2022 11:54  Mg     2.5     06-30    TPro  5.9<L>  /  Alb  2.1<L>  /  TBili  0.8  /  DBili  x   /  AST  97<H>  /  ALT  70  /  AlkPhos  204<H>  06-30            I&O's Summary    BNP  RADIOLOGY & ADDITIONAL STUDIES:

## 2022-07-01 NOTE — DISCHARGE NOTE PROVIDER - CARE PROVIDER_API CALL
Denys Mooney (DO)  Cardiology  [address] [place] [address]  Phone: [phone]  Fax: [phone]  Follow Up Time:

## 2022-07-01 NOTE — CHART NOTE - NSCHARTNOTEFT_GEN_A_CORE
Please see resident D/C summary for full details regarding the service.     General: Awake and alert, cooperative with exam. No acute distress.   Cardiology: Normal S1, S2. No murmurs. RRR.   Respiratory: Lungs CTABL. No wheezes, rales, or rhonchi.   Gastrointestinal: + BS. Soft. NT. ND. No guarding, rigidity, or rebound tenderness.  Extremities: No peripheral edema bilaterally.  Neurological: A+Ox3. CN 2-12 intact. No FND. Normal speech. No facial droop.     Assessment   - Bilateral pneumonia   - Legionella  - Hyponatremia   - Transaminitis     Plan   - Pt to complete a total of 10 days of Azithromycin   - f/u with GI outpt regarding lipase level -> may need MRCP and eventual cholecystectomy for cholithiasis   - D/C today

## 2022-07-01 NOTE — DISCHARGE NOTE PROVIDER - HOSPITAL COURSE
61 y/o female with a PMHx of HTN and HLD presents to the HH/ED with cough, weakness and decreased po intake x8 days, Fever x2 days ago Tmax 103. P.In the ED Xary and Ct scan showed right lower lobe and left upper lobe pneumonia. Prominent mediastinal  lymph nodes likely reactive. Pt was started on Azithromycin and Ceftriaxone and was admitted to med/surg for further management. On the liao pt was diagnosed with legionella pneumonia. Her imaging incidentally reveled gallstones and trace pericardial effusion. Pt was seen by ID, Cardiology, Surgery and GI teams. After discharge pt has to take po Azithromycin 500 mg daily for 6 days and follow-up with PCP for further management.     Today pt was seen by her bedside, awake and oriented to place and time. Vitals and examination are stable. Pt is medically stable for discharge.     Vital Signs Last 24 Hrs  T(C): 37 (01 Jul 2022 08:09), Max: 37.3 (30 Jun 2022 23:20)  T(F): 98.6 (01 Jul 2022 08:09), Max: 99.1 (30 Jun 2022 23:20)  HR: 91 (01 Jul 2022 10:20) (82 - 94)  BP: 153/88 (01 Jul 2022 10:20) (136/74 - 157/89)  BP(mean): --  RR: 18 (01 Jul 2022 08:09) (18 - 18)  SpO2: 93% (01 Jul 2022 08:09) (93% - 94%)    Examination-    GENERAL: NAD, lying in bed comfortably  HEAD:  Atraumatic, Normocephalic  EYES: EOMI, PERRLA, conjunctiva and sclera clear  ENT: Moist mucous membranes  NECK: Supple, No JVD  CHEST/LUNG: Clear to auscultation bilaterally; No rales, rhonchi, wheezing, or rubs. Unlabored respirations  HEART: Regular rate and rhythm; No murmurs, rubs, or gallops  ABDOMEN: Bowel sounds present; Soft, Nontender, Nondistended. No hepatomegally  EXTREMITIES:  2+ Peripheral Pulses, brisk capillary refill. No clubbing, cyanosis, or edema  NERVOUS SYSTEM:  Alert & Oriented X3, speech clear. No deficits   MSK: FROM all 4 extremities, full and equal strength  SKIN: No rashes or lesions 63 y/o female with a PMHx of HTN and HLD presents to the HH/ED with cough, weakness and decreased po intake x8 days, Fever x2 days ago Tmax 103. P.In the ED Xary and Ct scan showed right lower lobe and left upper lobe pneumonia. Prominent mediastinal  lymph nodes likely reactive. Pt was started on Azithromycin and Ceftriaxone and was admitted to med/surg for further management. On the liao pt was diagnosed with legionella pneumonia. Her imaging incidentally reveled gallstones and trace pericardial effusion. Pt was seen by ID, Cardiology, Surgery and GI teams. After discharge pt has to take po Azithromycin 500 mg daily for 6 days and follow-up with PCP for further management.     Today pt was seen by her bedside, awake and oriented to place and time. Vitals and examination are stable. Pt is medically stable for discharge.     Vital Signs Last 24 Hrs  T(C): 37 (01 Jul 2022 08:09), Max: 37.3 (30 Jun 2022 23:20)  T(F): 98.6 (01 Jul 2022 08:09), Max: 99.1 (30 Jun 2022 23:20)  HR: 91 (01 Jul 2022 10:20) (82 - 94)  BP: 153/88 (01 Jul 2022 10:20) (136/74 - 157/89)  BP(mean): --  RR: 18 (01 Jul 2022 08:09) (18 - 18)  SpO2: 93% (01 Jul 2022 08:09) (93% - 94%)    Examination-    GENERAL: NAD, lying in bed comfortably  HEAD:  Atraumatic, Normocephalic  EYES: EOMI, PERRLA, conjunctiva and sclera clear  ENT: Moist mucous membranes  NECK: Supple, No JVD  CHEST/LUNG: Clear to auscultation bilaterally; No rales, rhonchi, wheezing, or rubs. Unlabored respirations  HEART: Regular rate and rhythm; No murmurs, rubs, or gallops  ABDOMEN: Bowel sounds present; Soft, Nontender, Nondistended. No hepatomegally  EXTREMITIES:  2+ Peripheral Pulses, brisk capillary refill. No clubbing, cyanosis, or edema  NERVOUS SYSTEM:  Alert & Oriented X3, speech clear. No deficits   MSK: FROM all 4 extremities, full and equal strength  SKIN: No rashes or lesions    US Abdomen Upper Quadrant Right     Hepatic steatosis.  Cholelithiasis.    CT Chest No Cont     Right lower lobe and left upper lobe pneumonia. Prominent mediastinal   lymph nodes likely reactive.    Trace pericardial effusion.       63 y/o female with a PMHx of HTN and HLD presents to the HH/ED with cough, weakness, and decreased PO intake x8 days, Fever x2 days ago Tmax 103. P.In the ED, X-ray and Ct scan showed right lower lobe and left upper lobe pneumonia. Prominent mediastinal lymph nodes are likely reactive. Pt was started on Azithromycin and Ceftriaxone and was admitted to Med/Surg for further management. On the liao, pt was diagnosed with Legionella pneumonia. Her imaging incidentally revealed gallstones and trace pericardial effusion. Pt was seen by ID, Cardiology, Surgery, and GI teams. After discharge, pt has to take Po Azithromycin 500 mg daily for six days and follow up with PCP for further management.     Today pt was seen by her bedside, awake and oriented to place and time. Vitals and examination are stable. Pt is medically stable for discharge.       Vital Signs Last 24 Hrs  T(C): 37 (01 Jul 2022 08:09), Max: 37.3 (30 Jun 2022 23:20)  T(F): 98.6 (01 Jul 2022 08:09), Max: 99.1 (30 Jun 2022 23:20)  HR: 91 (01 Jul 2022 10:20) (82 - 94)  BP: 153/88 (01 Jul 2022 10:20) (136/74 - 157/89)  BP(mean): --  RR: 18 (01 Jul 2022 08:09) (18 - 18)  SpO2: 93% (01 Jul 2022 08:09) (93% - 94%)    Examination-    GENERAL: NAD, lying in bed comfortably  HEAD:  Atraumatic, Normocephalic  EYES: EOMI, PERRLA, conjunctiva and sclera clear  ENT: Moist mucous membranes  NECK: Supple, No JVD  CHEST/LUNG: Clear to auscultation bilaterally; No rales, rhonchi, wheezing, or rubs. Unlabored respirations  HEART: Regular rate and rhythm; No murmurs, rubs, or gallops  ABDOMEN: Bowel sounds present; Soft, Nontender, Nondistended. No hepatomegally  EXTREMITIES:  2+ Peripheral Pulses, brisk capillary refill. No clubbing, cyanosis, or edema  NERVOUS SYSTEM:  Alert & Oriented X3, speech clear. No deficits   MSK: FROM all 4 extremities, full and equal strength  SKIN: No rashes or lesions    US Abdomen Upper Quadrant Right     Hepatic steatosis.  Cholelithiasis.    CT Chest No Cont     Right lower lobe and left upper lobe pneumonia. Prominent mediastinal   lymph nodes likely reactive.    Trace pericardial effusion.

## 2022-07-01 NOTE — DISCHARGE NOTE PROVIDER - NSDCCPCAREPLAN_GEN_ALL_CORE_FT
PRINCIPAL DISCHARGE DIAGNOSIS  Diagnosis: Multifocal pneumonia  Assessment and Plan of Treatment: You were treated  for lung infection. Please take azitrhromycine 500 mg daily for 6 more days and follw-up with your primary care provider for further care.      SECONDARY DISCHARGE DIAGNOSES  Diagnosis: Weakness  Assessment and Plan of Treatment:      PRINCIPAL DISCHARGE DIAGNOSIS  Diagnosis: Multifocal pneumonia  Assessment and Plan of Treatment: You were treated for a lung infection. Please take azithromycin 500 mg daily for six more days and follow up with your primary care provider for further care.      SECONDARY DISCHARGE DIAGNOSES  Diagnosis: Weakness  Assessment and Plan of Treatment:

## 2022-07-01 NOTE — DISCHARGE NOTE PROVIDER - NSDCCAREPROVSEEN_GEN_ALL_CORE_FT
Maria Antonia, Melissa Miller, Brandon Ghosh, Mario Alberto Sr, Job Pantoja, Emelyn Benitez, Madison Teixeira, Doris Greenberg, Ti Aguirre, Jonna Landry, Cindy Wetzel, Law Wetzel, Hailey Peraza, Mikel Clement, Thompson Schaffer, Emilia Beckham, Trevin

## 2022-07-01 NOTE — PROGRESS NOTE ADULT - PROVIDER SPECIALTY LIST ADULT
Hospitalist
Hospitalist
Surgery
Bariatric Surgery
Family Medicine
Infectious Disease
Family Medicine
Surgery
Cardiology

## 2022-07-05 LAB — B BURGDOR DNA SPEC QL NAA+PROBE: NEGATIVE — SIGNIFICANT CHANGE UP

## 2022-07-07 DIAGNOSIS — E86.0 DEHYDRATION: ICD-10-CM

## 2022-07-07 DIAGNOSIS — K76.0 FATTY (CHANGE OF) LIVER, NOT ELSEWHERE CLASSIFIED: ICD-10-CM

## 2022-07-07 DIAGNOSIS — A48.1 LEGIONNAIRES' DISEASE: ICD-10-CM

## 2022-07-07 DIAGNOSIS — E87.1 HYPO-OSMOLALITY AND HYPONATREMIA: ICD-10-CM

## 2022-07-07 DIAGNOSIS — Z82.49 FAMILY HISTORY OF ISCHEMIC HEART DISEASE AND OTHER DISEASES OF THE CIRCULATORY SYSTEM: ICD-10-CM

## 2022-07-07 DIAGNOSIS — I10 ESSENTIAL (PRIMARY) HYPERTENSION: ICD-10-CM

## 2022-07-07 DIAGNOSIS — Z79.82 LONG TERM (CURRENT) USE OF ASPIRIN: ICD-10-CM

## 2022-07-07 DIAGNOSIS — E78.5 HYPERLIPIDEMIA, UNSPECIFIED: ICD-10-CM

## 2022-07-07 DIAGNOSIS — R74.01 ELEVATION OF LEVELS OF LIVER TRANSAMINASE LEVELS: ICD-10-CM

## 2022-07-07 DIAGNOSIS — I95.89 OTHER HYPOTENSION: ICD-10-CM

## 2022-07-07 DIAGNOSIS — K80.20 CALCULUS OF GALLBLADDER WITHOUT CHOLECYSTITIS WITHOUT OBSTRUCTION: ICD-10-CM

## 2022-07-07 DIAGNOSIS — I31.3 PERICARDIAL EFFUSION (NONINFLAMMATORY): ICD-10-CM

## 2022-07-11 LAB
A PHAGOCYTOPH IGG TITR SER IF: NEGATIVE — SIGNIFICANT CHANGE UP
A PHAGOCYTOPH IGM TITR SER IF: NEGATIVE — SIGNIFICANT CHANGE UP
E CHAFFEENSIS IGG TITR SER: NEGATIVE — SIGNIFICANT CHANGE UP
E CHAFFEENSIS IGM TITR SER IF: NEGATIVE — SIGNIFICANT CHANGE UP

## 2022-07-13 ENCOUNTER — APPOINTMENT (OUTPATIENT)
Dept: FAMILY MEDICINE | Facility: CLINIC | Age: 62
End: 2022-07-13

## 2022-07-13 VITALS
BODY MASS INDEX: 34.1 KG/M2 | RESPIRATION RATE: 16 BRPM | DIASTOLIC BLOOD PRESSURE: 88 MMHG | TEMPERATURE: 97.2 F | HEART RATE: 82 BPM | HEIGHT: 68 IN | WEIGHT: 225 LBS | OXYGEN SATURATION: 96 % | SYSTOLIC BLOOD PRESSURE: 140 MMHG

## 2022-07-13 PROBLEM — E78.5 HYPERLIPIDEMIA, UNSPECIFIED: Chronic | Status: ACTIVE | Noted: 2022-06-29

## 2022-07-13 PROBLEM — I10 ESSENTIAL (PRIMARY) HYPERTENSION: Chronic | Status: ACTIVE | Noted: 2022-06-29

## 2022-07-13 PROCEDURE — G0444 DEPRESSION SCREEN ANNUAL: CPT | Mod: 59

## 2022-07-13 PROCEDURE — 36415 COLL VENOUS BLD VENIPUNCTURE: CPT

## 2022-07-13 PROCEDURE — 99386 PREV VISIT NEW AGE 40-64: CPT | Mod: 25

## 2022-07-13 RX ORDER — ATORVASTATIN CALCIUM 40 MG/1
40 TABLET, FILM COATED ORAL
Qty: 30 | Refills: 0 | Status: ACTIVE | COMMUNITY
Start: 2022-05-17

## 2022-07-13 RX ORDER — LABETALOL HYDROCHLORIDE 300 MG/1
300 TABLET, FILM COATED ORAL
Qty: 120 | Refills: 0 | Status: ACTIVE | COMMUNITY
Start: 2022-03-17

## 2022-07-13 RX ORDER — LOSARTAN POTASSIUM AND HYDROCHLOROTHIAZIDE 25; 100 MG/1; MG/1
100-25 TABLET ORAL
Qty: 30 | Refills: 0 | Status: ACTIVE | COMMUNITY
Start: 2022-02-10

## 2022-07-13 NOTE — HISTORY OF PRESENT ILLNESS
[FreeTextEntry1] : NPPE [de-identified] : 62-year-old female for new patient physical exam.\par \par Recent hospitalization.  Patient was discharged on July 1 due to Legionella pneumonia.  He was admitted on June 28.  Patient received ceftriaxone and azithromycin while in hospital.  She was sent home with 6-days of 500 mg Zithromax daily to complete antibiotic course.\par Patient was also noted to have gallstones and trace pericardial effusion.  Patient was evaluated by both surgery and cardiology in addition to ID.  Surgical intervention was not felt to be necessary for gallstones.  Cardiology noted no further work-up for pericardial effusion.\par \par Patient dates she is feeling better but does note residual fatigue.  States that fatigue is improving but she does get tired with daily activities.  Eating, drinking, voiding and BM within normal limits.\par \par History of hypertension/hyperlipidemia.  Patient does follow with cardiology, Dr. Mooney.  Is on labetalol, losartan–HCTZ and atorvastatin.

## 2022-07-13 NOTE — PHYSICAL EXAM
[No Acute Distress] : no acute distress [Well Nourished] : well nourished [Well Developed] : well developed [Well-Appearing] : well-appearing [Normal Voice/Communication] : normal voice/communication [Normal Sclera/Conjunctiva] : normal sclera/conjunctiva [Supple] : supple [No Respiratory Distress] : no respiratory distress  [Clear to Auscultation] : lungs were clear to auscultation bilaterally [Normal Rate] : normal rate  [Normal S1, S2] : normal S1 and S2 [Soft] : abdomen soft [Non Tender] : non-tender [Normal Bowel Sounds] : normal bowel sounds [Speech Grossly Normal] : speech grossly normal [Normal Affect] : the affect was normal [Normal Mood] : the mood was normal

## 2022-07-13 NOTE — PLAN
[FreeTextEntry1] : 62-year-old female for new patient physical exam.  Patient to schedule mammo and Pap with GYN.  We will schedule colonoscopy when patient is feeling better.  Labs obtained today.  Patient has been vaccinated for COVID.\par \par Hypertension/Hyperlipidemia.  Most recent lipid panel to be obtained.  BP stable.  Patient to keep follow-up with cardiology.  Continue current medication regimen.\par \par Legionella pneumonia.  Improved.  Repeat chest x-ray in 2 weeks.  Patient to follow-up with PCP at this time to evaluate return to work status.\par \par Signs and symptoms warranting further evaluation discussed.  All questions answered.  Patient voiced understanding and agreement above plan.

## 2022-07-13 NOTE — HEALTH RISK ASSESSMENT
[1] : 1) Little interest or pleasure doing things for several days (1) [0] : 2) Feeling down, depressed, or hopeless: Not at all (0) [[id number]] : 1

## 2022-07-13 NOTE — REVIEW OF SYSTEMS
[Fatigue] : fatigue [Fever] : no fever [Discharge] : no discharge [Earache] : no earache [Chest Pain] : no chest pain [Shortness Of Breath] : no shortness of breath [Abdominal Pain] : no abdominal pain [Dysuria] : no dysuria [Headache] : no headache

## 2022-07-15 LAB
ALBUMIN SERPL ELPH-MCNC: 4.1 G/DL
ALP BLD-CCNC: 146 U/L
ALT SERPL-CCNC: 36 U/L
ANION GAP SERPL CALC-SCNC: 14 MMOL/L
AST SERPL-CCNC: 27 U/L
BASOPHILS # BLD AUTO: 0.1 K/UL
BASOPHILS NFR BLD AUTO: 1.7 %
BILIRUB SERPL-MCNC: 0.5 MG/DL
BUN SERPL-MCNC: 16 MG/DL
CALCIUM SERPL-MCNC: 10 MG/DL
CHLORIDE SERPL-SCNC: 102 MMOL/L
CO2 SERPL-SCNC: 27 MMOL/L
CREAT SERPL-MCNC: 1.08 MG/DL
EGFR: 59 ML/MIN/1.73M2
EOSINOPHIL # BLD AUTO: 0.25 K/UL
EOSINOPHIL NFR BLD AUTO: 4.2 %
ESTIMATED AVERAGE GLUCOSE: 134 MG/DL
GLUCOSE SERPL-MCNC: 148 MG/DL
HBA1C MFR BLD HPLC: 6.3 %
HCT VFR BLD CALC: 41.6 %
HGB BLD-MCNC: 13.3 G/DL
IMM GRANULOCYTES NFR BLD AUTO: 0.2 %
LYMPHOCYTES # BLD AUTO: 1.49 K/UL
LYMPHOCYTES NFR BLD AUTO: 25.2 %
MAN DIFF?: NORMAL
MCHC RBC-ENTMCNC: 29.9 PG
MCHC RBC-ENTMCNC: 32 GM/DL
MCV RBC AUTO: 93.5 FL
MONOCYTES # BLD AUTO: 0.45 K/UL
MONOCYTES NFR BLD AUTO: 7.6 %
NEUTROPHILS # BLD AUTO: 3.61 K/UL
NEUTROPHILS NFR BLD AUTO: 61.1 %
PLATELET # BLD AUTO: 415 K/UL
POTASSIUM SERPL-SCNC: 3.4 MMOL/L
PROT SERPL-MCNC: 6.8 G/DL
RBC # BLD: 4.45 M/UL
RBC # FLD: 14.1 %
SODIUM SERPL-SCNC: 144 MMOL/L
TSH SERPL-ACNC: 2.42 UIU/ML
WBC # FLD AUTO: 5.91 K/UL

## 2022-07-25 ENCOUNTER — OUTPATIENT (OUTPATIENT)
Dept: OUTPATIENT SERVICES | Facility: HOSPITAL | Age: 62
LOS: 1 days | End: 2022-07-25
Payer: COMMERCIAL

## 2022-07-25 ENCOUNTER — APPOINTMENT (OUTPATIENT)
Dept: RADIOLOGY | Facility: CLINIC | Age: 62
End: 2022-07-25

## 2022-07-25 DIAGNOSIS — J18.9 PNEUMONIA, UNSPECIFIED ORGANISM: ICD-10-CM

## 2022-07-25 PROCEDURE — 71046 X-RAY EXAM CHEST 2 VIEWS: CPT

## 2022-07-25 PROCEDURE — 71046 X-RAY EXAM CHEST 2 VIEWS: CPT | Mod: 26

## 2022-07-29 ENCOUNTER — APPOINTMENT (OUTPATIENT)
Dept: FAMILY MEDICINE | Facility: CLINIC | Age: 62
End: 2022-07-29

## 2022-07-29 ENCOUNTER — NON-APPOINTMENT (OUTPATIENT)
Age: 62
End: 2022-07-29

## 2022-07-29 VITALS
SYSTOLIC BLOOD PRESSURE: 118 MMHG | DIASTOLIC BLOOD PRESSURE: 82 MMHG | RESPIRATION RATE: 16 BRPM | OXYGEN SATURATION: 98 % | TEMPERATURE: 97.7 F | HEART RATE: 84 BPM

## 2022-07-29 DIAGNOSIS — J18.9 PNEUMONIA, UNSPECIFIED ORGANISM: ICD-10-CM

## 2022-07-29 DIAGNOSIS — M62.838 OTHER MUSCLE SPASM: ICD-10-CM

## 2022-07-29 PROCEDURE — 99213 OFFICE O/P EST LOW 20 MIN: CPT | Mod: 25

## 2022-07-29 PROCEDURE — 36415 COLL VENOUS BLD VENIPUNCTURE: CPT

## 2022-07-29 NOTE — HISTORY OF PRESENT ILLNESS
[FreeTextEntry1] : Patient presents for follow up visit.  [de-identified] : 62-year-old female for follow-up.  Patient was hospitalized on June 28 due to Legionella pneumonia.  Discharged on July 1.  During previous visit on July 13 patient did note residual fatigue.  States today she is doing much better and ready to go back to work.\par \par \par Does report muscle soreness over her shoulders.  Has been using over-the-counter analgesia as needed with heat.  Seems to be improving.

## 2022-07-29 NOTE — PLAN
[FreeTextEntry1] : 62-year-old female for follow-up Legionella pneumonia.  Patient is significantly improved.  Repeat chest x-ray noted to be unremarkable.  Can return to work 8/2/2022 without restrictions.\par \par Muscle spasm.  Supportive therapy to be continued as needed.  Muscle relaxer given for as needed use as well.  Patient is aware of possible drowsy side effect.\par \par Hypokalemia/elevated alk phos/elevated platelets.  We will repeat blood work today.\par \par All questions answered.  Patient voiced understanding and in agreement above plan.  Return to clinic as recommended.\par \par

## 2022-07-30 ENCOUNTER — NON-APPOINTMENT (OUTPATIENT)
Age: 62
End: 2022-07-30

## 2022-08-01 LAB
ALBUMIN SERPL ELPH-MCNC: 4.5 G/DL
ALP BLD-CCNC: 95 U/L
ALT SERPL-CCNC: 41 U/L
ANION GAP SERPL CALC-SCNC: 9 MMOL/L
AST SERPL-CCNC: 30 U/L
BASOPHILS # BLD AUTO: 0.14 K/UL
BASOPHILS NFR BLD AUTO: 2.5 %
BILIRUB SERPL-MCNC: 0.5 MG/DL
BUN SERPL-MCNC: 16 MG/DL
CALCIUM SERPL-MCNC: 10 MG/DL
CHLORIDE SERPL-SCNC: 107 MMOL/L
CO2 SERPL-SCNC: 28 MMOL/L
CREAT SERPL-MCNC: 1.15 MG/DL
EGFR: 55 ML/MIN/1.73M2
EOSINOPHIL # BLD AUTO: 0.34 K/UL
EOSINOPHIL NFR BLD AUTO: 6.1 %
GLUCOSE SERPL-MCNC: 124 MG/DL
HCT VFR BLD CALC: 41.8 %
HGB BLD-MCNC: 13.6 G/DL
IMM GRANULOCYTES NFR BLD AUTO: 0.5 %
LYMPHOCYTES # BLD AUTO: 1.79 K/UL
LYMPHOCYTES NFR BLD AUTO: 32 %
MAN DIFF?: NORMAL
MCHC RBC-ENTMCNC: 31 PG
MCHC RBC-ENTMCNC: 32.5 GM/DL
MCV RBC AUTO: 95.2 FL
MONOCYTES # BLD AUTO: 0.51 K/UL
MONOCYTES NFR BLD AUTO: 9.1 %
NEUTROPHILS # BLD AUTO: 2.79 K/UL
NEUTROPHILS NFR BLD AUTO: 49.8 %
PLATELET # BLD AUTO: 268 K/UL
POTASSIUM SERPL-SCNC: 4.1 MMOL/L
PROT SERPL-MCNC: 6.6 G/DL
RBC # BLD: 4.39 M/UL
RBC # FLD: 15.2 %
SODIUM SERPL-SCNC: 143 MMOL/L
WBC # FLD AUTO: 5.6 K/UL

## 2022-09-28 ENCOUNTER — RX RENEWAL (OUTPATIENT)
Age: 62
End: 2022-09-28

## 2022-09-28 RX ORDER — CYCLOBENZAPRINE HYDROCHLORIDE 5 MG/1
5 TABLET, FILM COATED ORAL
Qty: 30 | Refills: 0 | Status: ACTIVE | COMMUNITY
Start: 2022-07-29 | End: 1900-01-01

## 2023-08-17 ENCOUNTER — RESULT REVIEW (OUTPATIENT)
Age: 63
End: 2023-08-17

## 2023-08-17 ENCOUNTER — NON-APPOINTMENT (OUTPATIENT)
Age: 63
End: 2023-08-17

## 2023-08-17 ENCOUNTER — APPOINTMENT (OUTPATIENT)
Dept: FAMILY MEDICINE | Facility: CLINIC | Age: 63
End: 2023-08-17
Payer: COMMERCIAL

## 2023-08-17 VITALS
BODY MASS INDEX: 38.8 KG/M2 | HEART RATE: 81 BPM | OXYGEN SATURATION: 97 % | WEIGHT: 256 LBS | DIASTOLIC BLOOD PRESSURE: 88 MMHG | HEIGHT: 68 IN | SYSTOLIC BLOOD PRESSURE: 134 MMHG | TEMPERATURE: 98 F

## 2023-08-17 DIAGNOSIS — M79.672 PAIN IN LEFT FOOT: ICD-10-CM

## 2023-08-17 DIAGNOSIS — E78.5 HYPERLIPIDEMIA, UNSPECIFIED: ICD-10-CM

## 2023-08-17 DIAGNOSIS — Z00.00 ENCOUNTER FOR GENERAL ADULT MEDICAL EXAMINATION W/OUT ABNORMAL FINDINGS: ICD-10-CM

## 2023-08-17 DIAGNOSIS — I10 ESSENTIAL (PRIMARY) HYPERTENSION: ICD-10-CM

## 2023-08-17 DIAGNOSIS — J30.89 OTHER ALLERGIC RHINITIS: ICD-10-CM

## 2023-08-17 PROCEDURE — 99396 PREV VISIT EST AGE 40-64: CPT

## 2023-08-17 RX ORDER — ASPIRIN 81 MG
81 TABLET, DELAYED RELEASE (ENTERIC COATED) ORAL DAILY
Refills: 0 | Status: ACTIVE | COMMUNITY

## 2023-08-17 RX ORDER — FLUTICASONE PROPIONATE 50 UG/1
50 SPRAY, METERED NASAL DAILY
Qty: 1 | Refills: 3 | Status: ACTIVE | COMMUNITY
Start: 2023-08-17 | End: 1900-01-01

## 2023-08-17 NOTE — PHYSICAL EXAM
[No Acute Distress] : no acute distress [Well Nourished] : well nourished [Well Developed] : well developed [Well-Appearing] : well-appearing [Normal Voice/Communication] : normal voice/communication [Normal Sclera/Conjunctiva] : normal sclera/conjunctiva [No Respiratory Distress] : no respiratory distress  [Clear to Auscultation] : lungs were clear to auscultation bilaterally [Normal Rate] : normal rate  [Normal S1, S2] : normal S1 and S2 [Soft] : abdomen soft [Normal Bowel Sounds] : normal bowel sounds [Speech Grossly Normal] : speech grossly normal [Normal Affect] : the affect was normal [Normal Mood] : the mood was normal

## 2023-08-17 NOTE — HEALTH RISK ASSESSMENT
[No] : No [0] : 2) Feeling down, depressed, or hopeless: Not at all (0) [Patient reported PAP Smear was normal] : Patient reported PAP Smear was normal [Never] : Never [EyeExamDate] : 08/2023 [PapSmearDate] : 00/2022

## 2023-08-17 NOTE — HISTORY OF PRESENT ILLNESS
[FreeTextEntry1] : CPE [de-identified] : 62 yo F for CPE.   History of hypertension/hyperlipidemia. Patient does follow with cardiology, Dr. Mooney. Is on labetalol, losartan-HCTZ and atorvastatin.  Noting left foot pain which is intermittent for some time now.  No injuries or trauma noted.  Patient also reporting inability to lose weight despite exhausting lifestyle modifications.  Also reporting nasal congestion.  Has tried antihistamine with some relief.  No fevers, shortness of breath, cough.

## 2023-08-17 NOTE — PLAN
[FreeTextEntry1] : 63-year-old female for annual physical exam.  Mammo and routine labs ordered.  Patient agreeable to Cologuard.  Hypertension/hyperlipidemia.  Stable.  Continue current therapy.  Keep follow-up with cardiology.  Left foot pain.  X-ray ordered.  Supportive therapy advised.  Nasal congestion.  Suggest Flonase.  If no improvement, patient to see ENT.  Obesity.  Weekly injectable medication such as Wegovy discussed.  Also discussed phentermine.  Patient will consider.  All questions answered.  Patient voiced understanding and in agreement to plan peer return to clinic as recommended.

## 2023-08-19 ENCOUNTER — OUTPATIENT (OUTPATIENT)
Dept: OUTPATIENT SERVICES | Facility: HOSPITAL | Age: 63
LOS: 1 days | End: 2023-08-19
Payer: COMMERCIAL

## 2023-08-19 ENCOUNTER — APPOINTMENT (OUTPATIENT)
Dept: RADIOLOGY | Facility: CLINIC | Age: 63
End: 2023-08-19
Payer: COMMERCIAL

## 2023-08-19 DIAGNOSIS — M79.672 PAIN IN LEFT FOOT: ICD-10-CM

## 2023-08-19 PROCEDURE — 73630 X-RAY EXAM OF FOOT: CPT

## 2023-08-19 PROCEDURE — 73630 X-RAY EXAM OF FOOT: CPT | Mod: 26,LT

## 2023-08-24 ENCOUNTER — APPOINTMENT (OUTPATIENT)
Dept: FAMILY MEDICINE | Facility: CLINIC | Age: 63
End: 2023-08-24
Payer: COMMERCIAL

## 2023-08-24 ENCOUNTER — NON-APPOINTMENT (OUTPATIENT)
Age: 63
End: 2023-08-24

## 2023-08-24 PROCEDURE — 36415 COLL VENOUS BLD VENIPUNCTURE: CPT

## 2023-08-28 LAB
ALBUMIN SERPL ELPH-MCNC: 4.4 G/DL
ALP BLD-CCNC: 105 U/L
ALT SERPL-CCNC: 91 U/L
ANION GAP SERPL CALC-SCNC: 12 MMOL/L
AST SERPL-CCNC: 52 U/L
BILIRUB SERPL-MCNC: 0.5 MG/DL
BUN SERPL-MCNC: 20 MG/DL
CALCIUM SERPL-MCNC: 9.6 MG/DL
CHLORIDE SERPL-SCNC: 107 MMOL/L
CHOLEST SERPL-MCNC: 127 MG/DL
CO2 SERPL-SCNC: 28 MMOL/L
CREAT SERPL-MCNC: 1.16 MG/DL
EGFR: 54 ML/MIN/1.73M2
ESTIMATED AVERAGE GLUCOSE: 131 MG/DL
FERRITIN SERPL-MCNC: 103 NG/ML
FOLATE SERPL-MCNC: >20 NG/ML
GLUCOSE SERPL-MCNC: 109 MG/DL
HBA1C MFR BLD HPLC: 6.2 %
HDLC SERPL-MCNC: 49 MG/DL
IRON SATN MFR SERPL: 26 %
IRON SERPL-MCNC: 77 UG/DL
LDLC SERPL CALC-MCNC: 62 MG/DL
NONHDLC SERPL-MCNC: 78 MG/DL
POTASSIUM SERPL-SCNC: 4.5 MMOL/L
PROT SERPL-MCNC: 6.3 G/DL
SODIUM SERPL-SCNC: 147 MMOL/L
TIBC SERPL-MCNC: 298 UG/DL
TRIGL SERPL-MCNC: 81 MG/DL
TSH SERPL-ACNC: 2.17 UIU/ML
UIBC SERPL-MCNC: 222 UG/DL
VIT B12 SERPL-MCNC: 1477 PG/ML

## 2024-07-02 NOTE — ED ADULT NURSE NOTE - CHIEF COMPLAINT
The patient is a 62y Female complaining of weakness.
What Is The Reason For Today's Visit?: Full Body Skin Examination
What Is The Reason For Today's Visit? (Being Monitored For X): the re-examination of lesions previously examined

## 2024-09-05 ENCOUNTER — APPOINTMENT (OUTPATIENT)
Dept: INTERNAL MEDICINE | Facility: CLINIC | Age: 64
End: 2024-09-05
Payer: COMMERCIAL

## 2024-09-05 VITALS
DIASTOLIC BLOOD PRESSURE: 80 MMHG | SYSTOLIC BLOOD PRESSURE: 126 MMHG | BODY MASS INDEX: 39.25 KG/M2 | OXYGEN SATURATION: 95 % | WEIGHT: 259 LBS | TEMPERATURE: 98.2 F | HEIGHT: 68 IN | HEART RATE: 87 BPM

## 2024-09-05 DIAGNOSIS — Z81.8 FAMILY HISTORY OF OTHER MENTAL AND BEHAVIORAL DISORDERS: ICD-10-CM

## 2024-09-05 DIAGNOSIS — Z78.9 OTHER SPECIFIED HEALTH STATUS: ICD-10-CM

## 2024-09-05 DIAGNOSIS — Z23 ENCOUNTER FOR IMMUNIZATION: ICD-10-CM

## 2024-09-05 DIAGNOSIS — Z00.00 ENCOUNTER FOR GENERAL ADULT MEDICAL EXAMINATION W/OUT ABNORMAL FINDINGS: ICD-10-CM

## 2024-09-05 DIAGNOSIS — Z13.1 ENCOUNTER FOR SCREENING FOR DIABETES MELLITUS: ICD-10-CM

## 2024-09-05 DIAGNOSIS — E66.9 OBESITY, UNSPECIFIED: ICD-10-CM

## 2024-09-05 DIAGNOSIS — Z82.49 FAMILY HISTORY OF ISCHEMIC HEART DISEASE AND OTHER DISEASES OF THE CIRCULATORY SYSTEM: ICD-10-CM

## 2024-09-05 DIAGNOSIS — I10 ESSENTIAL (PRIMARY) HYPERTENSION: ICD-10-CM

## 2024-09-05 DIAGNOSIS — E78.5 HYPERLIPIDEMIA, UNSPECIFIED: ICD-10-CM

## 2024-09-05 DIAGNOSIS — R09.81 NASAL CONGESTION: ICD-10-CM

## 2024-09-05 PROCEDURE — 36415 COLL VENOUS BLD VENIPUNCTURE: CPT

## 2024-09-05 PROCEDURE — 99386 PREV VISIT NEW AGE 40-64: CPT | Mod: 25

## 2024-09-05 PROCEDURE — 99213 OFFICE O/P EST LOW 20 MIN: CPT | Mod: 25

## 2024-09-05 PROCEDURE — 90471 IMMUNIZATION ADMIN: CPT

## 2024-09-05 PROCEDURE — 90750 HZV VACC RECOMBINANT IM: CPT

## 2024-09-05 NOTE — PHYSICAL EXAM
[Normal Sclera/Conjunctiva] : normal sclera/conjunctiva [PERRL] : pupils equal round and reactive to light [EOMI] : extraocular movements intact [Normal Rate] : normal rate  [Regular Rhythm] : with a regular rhythm [No Murmur] : no murmur heard [Normal Appearance] : normal in appearance [No Masses] : no palpable masses [No Nipple Discharge] : no nipple discharge [Normal Supraclavicular Nodes] : no supraclavicular lymphadenopathy [Normal Posterior Cervical Nodes] : no posterior cervical lymphadenopathy [Normal Anterior Cervical Nodes] : no anterior cervical lymphadenopathy [Normal] : affect was normal and insight and judgment were intact [de-identified] : distant S1 and S2 [de-identified] : obese

## 2024-09-05 NOTE — HISTORY OF PRESENT ILLNESS
[de-identified] : 64 yrs old female with PMHx of HTN, HLP, obesity, Hx of legionella pneumonia 2022, presents for a full annual examination. Ptn feels well, has no specific complaints except for persistent nasal congestion and needing to blow her nose, states at least for one year. Ptn states she has no allergies. she has tried flonase, it didn't help much. Ptn walks occasionally, ptn admits at eating bread and pasta on a regular basis. She is frustrated with her weight and asking for a good diet to follow. Saw the Cardiologist, Dr Mooney in May of this year. Echo was acceptable, mild LVH. EKG non-specific ST/T wave changes. Vaccine Hx: Covid, 2 initials, 1 booster Shingles-no Tdap- thinks she is uptodate will get flu shot soon

## 2024-09-05 NOTE — ASSESSMENT
[FreeTextEntry1] : Ptn is uptodate with her screenings, will order baseline bone density. She will get the flu vaccine, will give 1st dose of Shingrix today  1- HTN- controlled, cont meds Labetalol and losartan/HCTZ  2- Obesity- discussed low glycemic diet and the need to stay away from processed grains. Low glycemic diet Diet high in vegetables and fruits, large salad (4 cups) and half a large plate of cooked vegetables a day is sufficient. 2-3 fruits a day. preferably with a snack of nuts or after meals Starches need to be unprocessed, ie no flour. Jacobo bread Quarter of plate protein, quarter of plate unprocessed grain and half plate vegetables. Increase beans and lentils. Avoid red meat. No processed meat Lunch salad (add avocado, beans, lentils, chickpeas) or soup. (soup need to have lots of vegetables, alone or with lentils and beans, no pasta) Breakfast eggs OR yoghurt plus fruits plus walnuts OR oatmeal steel cut plus walnuts, yeny seeds or flax seeds or oat bran. The yeny seeds and or flax seeds can be soaked overnight with water and oatmeal. replace cookies with dark chocolate 70% or more. If you eat ice cream replace half of it with fruits and nuts and slowly shift to plain yoghurt instead. if you need that sweet zest, add some shredded coconut. or a dash of unpasteurized honey. If crave sweets eat 2 dates plus walnuts. Or some dried fruits and walnuts (figs/apricots) Introduce a bitter taste in your diet: dandelion, broccoli hudson, watercress, escarole, endives. bread can only do Jacobo (no flour) mx 2 slices 4 times a week. eliminate all other forms of flour including pasta except once a month. if you eat grains, make it once a day and stick to quinoa, wheat berries, brown rice or sweet potato/yam/potato/ unpearled barley. They can only cover a quarter of your plate. Half of lunch and dinner should be non-starchy vegetables. If you are hungry and need to snack avoid rice cakes, crackers and chips. can have hummus on celery, carrots, cabbage, fennel, or guacamole on same. or even better learn to snack on vegetables, raw, roasted or steamed with nothing added to them; the more vegetables you eat, the less insulin resistant you will be.. You can have a meal with nuts but beware of quantities, same with hummus and guacamole. all good but high calorie. Same with nut butter. this will lead to less fat deposition and weight loss. Exercise: cardio and weight around 60 minutes a day 5 days a week.  3- HLD- on atorvastatin- will check profile  4-Chronic nasal congestion- no hx of allergic rhinitis, however, could develop anytime, ptn may have acquired a new allergy... Can use flonase and Quercetin 500 mg bid together. will monitor

## 2024-09-05 NOTE — HEALTH RISK ASSESSMENT
[0] : 2) Feeling down, depressed, or hopeless: Not at all (0) [Never] : Never [Patient reported mammogram was normal] : Patient reported mammogram was normal [Patient reported colonoscopy was normal] : Patient reported colonoscopy was normal [Good] : ~his/her~ current health as good [Very Good] : ~his/her~  mood as very good [No] : In the past 12 months have you used drugs other than those required for medical reasons? No [No falls in past year] : Patient reported no falls in the past year [NO] : No [Patient reported PAP Smear was normal] : Patient reported PAP Smear was normal [HIV test declined] : HIV test declined [Hepatitis C test offered] : Hepatitis C test offered [None] : None [With Family] : lives with family [Employed] : employed [College] : College [] :  [# Of Children ___] : has [unfilled] children [Sexually Active] : sexually active [Feels Safe at Home] : Feels safe at home [Fully functional (bathing, dressing, toileting, transferring, walking, feeding)] : Fully functional (bathing, dressing, toileting, transferring, walking, feeding) [Fully functional (using the telephone, shopping, preparing meals, housekeeping, doing laundry, using] : Fully functional and needs no help or supervision to perform IADLs (using the telephone, shopping, preparing meals, housekeeping, doing laundry, using transportation, managing medications and managing finances) [Smoke Detector] : smoke detector [Carbon Monoxide Detector] : carbon monoxide detector [Seat Belt] :  uses seat belt [Sunscreen] : uses sunscreen [de-identified] : no [de-identified] : cardiologist [de-identified] : walks [de-identified] : too much bread and pasta [[id number]] : 0 [EyeExamDate] : 08/2024 [Reports changes in hearing] : Reports no changes in hearing [Reports changes in vision] : Reports no changes in vision [Reports changes in dental health] : Reports no changes in dental health [MammogramDate] : 00/2023 [PapSmearDate] : 2022? [BoneDensityComments] : due [ColonoscopyDate] : 00/2023 [de-identified] :  at [facility]

## 2024-09-05 NOTE — HEALTH RISK ASSESSMENT
[0] : 2) Feeling down, depressed, or hopeless: Not at all (0) [Never] : Never [Patient reported mammogram was normal] : Patient reported mammogram was normal [Patient reported colonoscopy was normal] : Patient reported colonoscopy was normal [Good] : ~his/her~ current health as good [Very Good] : ~his/her~  mood as very good [No] : In the past 12 months have you used drugs other than those required for medical reasons? No [No falls in past year] : Patient reported no falls in the past year [NO] : No [Patient reported PAP Smear was normal] : Patient reported PAP Smear was normal [HIV test declined] : HIV test declined [Hepatitis C test offered] : Hepatitis C test offered [None] : None [With Family] : lives with family [Employed] : employed [College] : College [] :  [# Of Children ___] : has [unfilled] children [Sexually Active] : sexually active [Feels Safe at Home] : Feels safe at home [Fully functional (bathing, dressing, toileting, transferring, walking, feeding)] : Fully functional (bathing, dressing, toileting, transferring, walking, feeding) [Fully functional (using the telephone, shopping, preparing meals, housekeeping, doing laundry, using] : Fully functional and needs no help or supervision to perform IADLs (using the telephone, shopping, preparing meals, housekeeping, doing laundry, using transportation, managing medications and managing finances) [Smoke Detector] : smoke detector [Carbon Monoxide Detector] : carbon monoxide detector [Seat Belt] :  uses seat belt [Sunscreen] : uses sunscreen [de-identified] : no [de-identified] : cardiologist [de-identified] : walks [de-identified] : too much bread and pasta [[id number]] : 0 [EyeExamDate] : 08/2024 [Reports changes in hearing] : Reports no changes in hearing [Reports changes in vision] : Reports no changes in vision [Reports changes in dental health] : Reports no changes in dental health [MammogramDate] : 00/2023 [PapSmearDate] : 2022? [BoneDensityComments] : due [ColonoscopyDate] : 00/2023 [de-identified] :  at [facility]

## 2024-09-05 NOTE — HISTORY OF PRESENT ILLNESS
[de-identified] : 64 yrs old female with PMHx of HTN, HLP, obesity, Hx of legionella pneumonia 2022, presents for a full annual examination. Ptn feels well, has no specific complaints except for persistent nasal congestion and needing to blow her nose, states at least for one year. Ptn states she has no allergies. she has tried flonase, it didn't help much. Ptn walks occasionally, ptn admits at eating bread and pasta on a regular basis. She is frustrated with her weight and asking for a good diet to follow. Saw the Cardiologist, Dr Mooney in May of this year. Echo was acceptable, mild LVH. EKG non-specific ST/T wave changes. Vaccine Hx: Covid, 2 initials, 1 booster Shingles-no Tdap- thinks she is uptodate will get flu shot soon

## 2024-09-05 NOTE — PHYSICAL EXAM
[Normal Sclera/Conjunctiva] : normal sclera/conjunctiva [PERRL] : pupils equal round and reactive to light [EOMI] : extraocular movements intact [Normal Rate] : normal rate  [Regular Rhythm] : with a regular rhythm [No Murmur] : no murmur heard [Normal Appearance] : normal in appearance [No Masses] : no palpable masses [No Nipple Discharge] : no nipple discharge [Normal Supraclavicular Nodes] : no supraclavicular lymphadenopathy [Normal Posterior Cervical Nodes] : no posterior cervical lymphadenopathy [Normal Anterior Cervical Nodes] : no anterior cervical lymphadenopathy [Normal] : affect was normal and insight and judgment were intact [de-identified] : distant S1 and S2 [de-identified] : obese

## 2024-09-08 LAB
25(OH)D3 SERPL-MCNC: 43.8 NG/ML
ALBUMIN SERPL ELPH-MCNC: 4.6 G/DL
ALP BLD-CCNC: 106 U/L
ALT SERPL-CCNC: 89 U/L
ANION GAP SERPL CALC-SCNC: 13 MMOL/L
AST SERPL-CCNC: 65 U/L
BASOPHILS # BLD AUTO: 0.08 K/UL
BASOPHILS NFR BLD AUTO: 1.2 %
BILIRUB SERPL-MCNC: 0.9 MG/DL
BUN SERPL-MCNC: 14 MG/DL
CALCIUM SERPL-MCNC: 9.5 MG/DL
CHLORIDE SERPL-SCNC: 104 MMOL/L
CHOLEST SERPL-MCNC: 149 MG/DL
CO2 SERPL-SCNC: 27 MMOL/L
CREAT SERPL-MCNC: 1.18 MG/DL
CRP SERPL HS-MCNC: 2.77 MG/L
EGFR: 52 ML/MIN/1.73M2
EOSINOPHIL # BLD AUTO: 0.26 K/UL
EOSINOPHIL NFR BLD AUTO: 4 %
ESTIMATED AVERAGE GLUCOSE: 131 MG/DL
FERRITIN SERPL-MCNC: 130 NG/ML
GLUCOSE SERPL-MCNC: 113 MG/DL
HBA1C MFR BLD HPLC: 6.2 %
HCT VFR BLD CALC: 46.6 %
HCV AB SER QL: NONREACTIVE
HCV S/CO RATIO: 0.06 S/CO
HCYS SERPL-MCNC: 9.9 UMOL/L
HDLC SERPL-MCNC: 49 MG/DL
HGB BLD-MCNC: 14.4 G/DL
IMM GRANULOCYTES NFR BLD AUTO: 0.6 %
LDLC SERPL CALC-MCNC: 79 MG/DL
LYMPHOCYTES # BLD AUTO: 1.81 K/UL
LYMPHOCYTES NFR BLD AUTO: 27.7 %
MAN DIFF?: NORMAL
MCHC RBC-ENTMCNC: 30.9 GM/DL
MCHC RBC-ENTMCNC: 31.4 PG
MCV RBC AUTO: 101.5 FL
MONOCYTES # BLD AUTO: 0.47 K/UL
MONOCYTES NFR BLD AUTO: 7.2 %
NEUTROPHILS # BLD AUTO: 3.87 K/UL
NEUTROPHILS NFR BLD AUTO: 59.3 %
NONHDLC SERPL-MCNC: 100 MG/DL
PLATELET # BLD AUTO: 253 K/UL
POTASSIUM SERPL-SCNC: 4.7 MMOL/L
PROT SERPL-MCNC: 6.4 G/DL
RBC # BLD: 4.59 M/UL
RBC # FLD: 14.7 %
SODIUM SERPL-SCNC: 144 MMOL/L
T3FREE SERPL-MCNC: 3.52 PG/ML
TRIGL SERPL-MCNC: 116 MG/DL
TSH SERPL-ACNC: 1.94 UIU/ML
VIT B12 SERPL-MCNC: 923 PG/ML
WBC # FLD AUTO: 6.53 K/UL

## 2024-09-13 LAB — DHEA-SULFATE, SERUM: 28 UG/DL

## 2024-11-12 DIAGNOSIS — Z23 ENCOUNTER FOR IMMUNIZATION: ICD-10-CM

## 2024-11-15 ENCOUNTER — MED ADMIN CHARGE (OUTPATIENT)
Age: 64
End: 2024-11-15

## 2024-11-15 ENCOUNTER — APPOINTMENT (OUTPATIENT)
Dept: INTERNAL MEDICINE | Facility: CLINIC | Age: 64
End: 2024-11-15
Payer: COMMERCIAL

## 2024-11-15 PROCEDURE — 90471 IMMUNIZATION ADMIN: CPT

## 2024-11-15 PROCEDURE — 90750 HZV VACC RECOMBINANT IM: CPT

## 2025-09-09 ENCOUNTER — NON-APPOINTMENT (OUTPATIENT)
Age: 65
End: 2025-09-09

## 2025-09-11 ENCOUNTER — APPOINTMENT (OUTPATIENT)
Dept: FAMILY MEDICINE | Facility: CLINIC | Age: 65
End: 2025-09-11
Payer: COMMERCIAL

## 2025-09-11 VITALS
WEIGHT: 250 LBS | HEART RATE: 74 BPM | DIASTOLIC BLOOD PRESSURE: 90 MMHG | HEIGHT: 68 IN | OXYGEN SATURATION: 98 % | BODY MASS INDEX: 37.89 KG/M2 | SYSTOLIC BLOOD PRESSURE: 126 MMHG

## 2025-09-11 DIAGNOSIS — Z00.00 ENCOUNTER FOR GENERAL ADULT MEDICAL EXAMINATION W/OUT ABNORMAL FINDINGS: ICD-10-CM

## 2025-09-11 PROCEDURE — 99396 PREV VISIT EST AGE 40-64: CPT

## 2025-09-11 PROCEDURE — 36415 COLL VENOUS BLD VENIPUNCTURE: CPT

## 2025-09-12 LAB
ALBUMIN SERPL ELPH-MCNC: 4.1 G/DL
ALP BLD-CCNC: 93 U/L
ALT SERPL-CCNC: 79 U/L
ANION GAP SERPL CALC-SCNC: 14 MMOL/L
AST SERPL-CCNC: 46 U/L
BASOPHILS # BLD AUTO: 0.07 K/UL
BASOPHILS NFR BLD AUTO: 1.1 %
BILIRUB SERPL-MCNC: 0.8 MG/DL
BUN SERPL-MCNC: 14 MG/DL
CALCIUM SERPL-MCNC: 9.1 MG/DL
CHLORIDE SERPL-SCNC: 108 MMOL/L
CHOLEST SERPL-MCNC: 143 MG/DL
CO2 SERPL-SCNC: 23 MMOL/L
CREAT SERPL-MCNC: 1.07 MG/DL
EGFRCR SERPLBLD CKD-EPI 2021: 58 ML/MIN/1.73M2
EOSINOPHIL # BLD AUTO: 0.27 K/UL
EOSINOPHIL NFR BLD AUTO: 4.2 %
ESTIMATED AVERAGE GLUCOSE: 148 MG/DL
GLUCOSE SERPL-MCNC: 128 MG/DL
HBA1C MFR BLD HPLC: 6.8 %
HCT VFR BLD CALC: 45.4 %
HDLC SERPL-MCNC: 45 MG/DL
HGB BLD-MCNC: 14.3 G/DL
IMM GRANULOCYTES NFR BLD AUTO: 0.3 %
LDLC SERPL-MCNC: 76 MG/DL
LYMPHOCYTES # BLD AUTO: 1.78 K/UL
LYMPHOCYTES NFR BLD AUTO: 27.7 %
MAN DIFF?: NORMAL
MCHC RBC-ENTMCNC: 31.5 G/DL
MCHC RBC-ENTMCNC: 31.7 PG
MCV RBC AUTO: 100.7 FL
MONOCYTES # BLD AUTO: 0.44 K/UL
MONOCYTES NFR BLD AUTO: 6.8 %
NEUTROPHILS # BLD AUTO: 3.85 K/UL
NEUTROPHILS NFR BLD AUTO: 59.9 %
NONHDLC SERPL-MCNC: 98 MG/DL
PLATELET # BLD AUTO: 232 K/UL
POTASSIUM SERPL-SCNC: 4.5 MMOL/L
PROT SERPL-MCNC: 6.4 G/DL
RBC # BLD: 4.51 M/UL
RBC # FLD: 14.3 %
SODIUM SERPL-SCNC: 145 MMOL/L
TRIGL SERPL-MCNC: 119 MG/DL
TSH SERPL-ACNC: 2.78 UIU/ML
WBC # FLD AUTO: 6.43 K/UL

## 2025-09-15 LAB
FOLATE SERPL-MCNC: 12.1 NG/ML
VIT B12 SERPL-MCNC: 738 PG/ML